# Patient Record
Sex: MALE | Race: WHITE | NOT HISPANIC OR LATINO | Employment: OTHER | URBAN - METROPOLITAN AREA
[De-identification: names, ages, dates, MRNs, and addresses within clinical notes are randomized per-mention and may not be internally consistent; named-entity substitution may affect disease eponyms.]

---

## 2017-03-28 RX ORDER — TEMAZEPAM 15 MG/1
15 CAPSULE ORAL NIGHTLY PRN
COMMUNITY
End: 2022-01-05

## 2017-03-28 RX ORDER — ACETAMINOPHEN 325 MG/1
325 TABLET ORAL EVERY 6 HOURS PRN
COMMUNITY
End: 2022-03-09

## 2017-03-28 RX ORDER — CYCLOBENZAPRINE HCL 10 MG
10 TABLET ORAL 3 TIMES DAILY PRN
COMMUNITY
End: 2022-03-09

## 2017-03-28 RX ORDER — DOFETILIDE 0.25 MG/1
250 CAPSULE ORAL EVERY 12 HOURS
COMMUNITY
End: 2017-03-31

## 2017-03-28 RX ORDER — PRAVASTATIN SODIUM 40 MG/1
40 TABLET ORAL NIGHTLY
COMMUNITY
End: 2022-03-09

## 2017-03-28 RX ORDER — WARFARIN 2 MG/1
2 TABLET ORAL DAILY
COMMUNITY
End: 2022-01-05

## 2017-03-28 RX ORDER — AMOXICILLIN 500 MG
2 CAPSULE ORAL DAILY
COMMUNITY

## 2017-03-28 RX ORDER — CARVEDILOL 25 MG/1
25 TABLET ORAL 2 TIMES DAILY WITH MEALS
COMMUNITY

## 2017-03-28 RX ORDER — DOXAZOSIN 8 MG/1
4 TABLET ORAL NIGHTLY
COMMUNITY
End: 2022-03-09

## 2017-03-28 RX ORDER — PYRIDOXINE HCL (VITAMIN B6) 100 MG
50 TABLET ORAL DAILY
COMMUNITY

## 2017-03-28 RX ORDER — FOLIC ACID 1 MG/1
1 TABLET ORAL DAILY
COMMUNITY

## 2017-03-28 RX ORDER — TAMSULOSIN HYDROCHLORIDE 0.4 MG/1
0.4 CAPSULE ORAL NIGHTLY
COMMUNITY

## 2017-03-28 RX ORDER — OMEPRAZOLE 20 MG/1
20 CAPSULE, DELAYED RELEASE ORAL DAILY
Status: ON HOLD | COMMUNITY
End: 2017-05-17

## 2017-03-28 RX ORDER — LANOLIN ALCOHOL/MO/W.PET/CERES
100 CREAM (GRAM) TOPICAL DAILY
COMMUNITY

## 2017-03-31 ENCOUNTER — OFFICE VISIT (OUTPATIENT)
Dept: CARDIOLOGY | Facility: CLINIC | Age: 72
End: 2017-03-31
Payer: MEDICARE

## 2017-03-31 VITALS — DIASTOLIC BLOOD PRESSURE: 62 MMHG | SYSTOLIC BLOOD PRESSURE: 116 MMHG | WEIGHT: 205.31 LBS | HEART RATE: 88 BPM

## 2017-03-31 DIAGNOSIS — I10 ESSENTIAL HYPERTENSION: ICD-10-CM

## 2017-03-31 DIAGNOSIS — Z79.01 WARFARIN ANTICOAGULATION: ICD-10-CM

## 2017-03-31 DIAGNOSIS — I48.19 PERSISTENT ATRIAL FIBRILLATION: ICD-10-CM

## 2017-03-31 DIAGNOSIS — Z79.899 LONG TERM CURRENT USE OF ANTIARRHYTHMIC DRUG: ICD-10-CM

## 2017-03-31 PROCEDURE — 99205 OFFICE O/P NEW HI 60 MIN: CPT | Mod: S$GLB,,, | Performed by: INTERNAL MEDICINE

## 2017-03-31 RX ORDER — VERAPAMIL HYDROCHLORIDE 180 MG/1
180 CAPSULE, EXTENDED RELEASE ORAL DAILY
Qty: 30 CAPSULE | Refills: 11 | Status: ON HOLD | OUTPATIENT
Start: 2017-03-31 | End: 2017-05-16

## 2017-03-31 NOTE — PROGRESS NOTES
Subjective:     HPI    Cardiologist: Nathalie Lorenz MD    I had the pleasure of seeing Humble Narvaez in consultation at your request for the evaluation of atrial fibrillation. He is a 71-year old male with a history of HTN, gout, and GERD, whose history of AF dates back to 2004 when he began to note occasional fatigue and GUO. In 2009 he was diagnosed with AF. He underwent an electrical cardioversion, and was started on Warfarin and Tikosyn around that time. Over the years Mr. Narvaez has had episodes 1-2 times per month generally lasting 10-12 hours. However, recently episodes have been occurring more frequently and lasting longer. He thinks his AF has been persistent since 1/2017.  Carvedilol was recently doubled, which somewhat improved Mr. Narvaez's AF symptoms, which include dizziness, fatigue, SOB and GUO.    I reviewed all cardiac records in the EMR. ECGs dated 3/2017 and 12/2016 show AF with rates in the  bpm range. Other ECGs show sinus rhythm in the 80-90s bpm range. A cardiac catheterization done in 11/2016 showed a 50% DX1 lesion, and otherwise no CAD. Finally, an echo done in 10/2016 revealed a normal EF and LA size, and no significant valvular disease.     I reviewed today's ECG tracing, which shows atrial fibrillation with intermittent aberrant conduction, at 120 bpm.    Review of Systems   Constitution: Positive for malaise/fatigue. Negative for decreased appetite, weight gain and weight loss.   HENT: Negative for sore throat.    Eyes: Negative for blurred vision.   Cardiovascular: Positive for dyspnea on exertion. Negative for chest pain, irregular heartbeat, leg swelling, near-syncope, orthopnea, palpitations, paroxysmal nocturnal dyspnea and syncope.   Respiratory: Positive for shortness of breath.    Skin: Negative for rash.   Musculoskeletal: Negative for arthritis.   Gastrointestinal: Negative for abdominal pain.   Neurological: Negative for focal weakness.   Psychiatric/Behavioral: Negative for  altered mental status.        Objective:    Physical Exam   Constitutional: He is oriented to person, place, and time. He appears well-developed and well-nourished. No distress.   HENT:   Head: Normocephalic and atraumatic.   Mouth/Throat: Oropharynx is clear and moist.   Eyes: Pupils are equal, round, and reactive to light. No scleral icterus.   Neck: Neck supple. No thyromegaly present.   Cardiovascular: Normal heart sounds and normal pulses.  An irregularly irregular rhythm present. Tachycardia present.  Exam reveals no gallop and no friction rub.    No murmur heard.  Pulmonary/Chest: Effort normal and breath sounds normal.   Abdominal: Soft. Bowel sounds are normal. He exhibits no distension. There is no tenderness.   Musculoskeletal: He exhibits no edema.   Neurological: He is alert and oriented to person, place, and time.   Skin: Skin is warm and dry. No rash noted.   Psychiatric: He has a normal mood and affect. His behavior is normal.   Vitals reviewed.        Assessment:       1. Persistent atrial fibrillation    2. Long term current use of antiarrhythmic drug    3. Warfarin anticoagulation    4. Essential hypertension         Plan:   In summary, Humble Narvaez is a 71-year old male with a history of HTN and persistent symptomatic AF refractory to Tikosyn. We discussed the pathophysiology of Tikosyn as well as the myriad of treatment options available to manage it including medications and ablation. We specifically discussed the risks, benefits, indications, and alternatives to PVI. Risks discussed include hematoma and vascular injury, cardiac tamponade, stroke, PV stenosis, AE fistula, phrenic nerve damage, and death. After considering his options, he has decided to proceed with ablation.    For now, the plan is to stop tikosyn. In 5-days, he will start Verapamil  mg daily. A pre-procedure CT scan will be done in [place]. Warfarin will not be held for the procedure. Post-procedure, Amiodarone will be  started.    Thank you for allowing me to participate in the care of this patient. Please do not hesitate to call me with any questions or concerns.

## 2017-04-24 ENCOUNTER — TELEPHONE (OUTPATIENT)
Dept: ELECTROPHYSIOLOGY | Facility: CLINIC | Age: 72
End: 2017-04-24

## 2017-04-24 NOTE — TELEPHONE ENCOUNTER
Pt was calling about dates for testing needed prior to procedure. Advised Pt that we would be in touch with him as soon as it was finalized. Pt would like for us to call and coordinate dates with him because he has a friend that's dying and doesn't want dates to conflict.

## 2017-04-24 NOTE — TELEPHONE ENCOUNTER
----- Message from Lillian Lundy RN sent at 4/24/2017  3:14 PM CDT -----  Contact: self   Please let him know he is scheduled for 5/16/17 and we will call him soon to review instructions.   ----- Message -----     From: Debi He MA     Sent: 4/24/2017  10:42 AM       To: Lillian Lundy RN    Pt.is calling  to find out when procedure is going to be. Please call either phone.  pt.

## 2017-05-05 ENCOUNTER — TELEPHONE (OUTPATIENT)
Dept: ELECTROPHYSIOLOGY | Facility: CLINIC | Age: 72
End: 2017-05-05

## 2017-05-05 NOTE — TELEPHONE ENCOUNTER
----- Message from Summer Bolanos sent at 5/5/2017  1:51 PM CDT -----  Contact: pt   Pt returned your call.  He can be reached @ [phone].  Thanks!!

## 2017-05-05 NOTE — PROGRESS NOTES
ABLATION EDUCATION CHECKLIST    5/15/17 - RENÉE  This test will be performed by Dr. Lorenz  Someone from Dr. Lorenz's staff will contact you prior to your test with instructions, including test time and location.     5/16/17 @ 6 AM  Report to Cardiology Waiting Room on 3rd floor of the Hospital    (Do not report to clinic)  Directions for Reporting to Cardiology Waiting Area in the Hospital  If you park in the Parking Garage:  Take elevators to the 2nd floor  Walk up ramp and turn right by Gold Elevators  Take elevator to the 3rd floor  Upon exiting the elevator, turn away from the clinic areas  Walk long hayward around to front of hospital to area with windows overlooking [facility]  Check in at Reception Desk  OR  If family is dropping you off:  Have them drop you off at the front of the Hospital  (Near the ER, where all the flags are hung).  Take the E elevators to the 3rd floor.  Check in at the Reception Desk in the waiting room.    Do not eat or drink anything after: 12 mn on the night before your procedure    Medications:   You may take your usual morning medications with a sip of water (including warfarin/coumadin)    You will be spending the night after your procedure  You will need someone to drive you home the day after your procedure.    Your pain during your procedure will be managed by the anesthesia team.     THE ABOVE INSTRUCTIONS WERE GIVEN TO THE PATIENT VERBALLY AND THEY VERBALIZED UNDERSTANDING.  THEY DO NOT REQUIRE ANY SPECIAL NEEDS AND DO NOT HAVE ANY LEARNING BARRIERS.    Any need to reschedule or cancel procedures, or any questions regarding your procedures should be addressed directly with the Arrhythmia Department Nurses at the following phone number: [phone]

## 2017-05-05 NOTE — TELEPHONE ENCOUNTER
Called pt back, reviewed pre op instructions including RENÉE with Dr Lorenz on 5/15. Pt verbalized understanding.

## 2017-05-11 ENCOUNTER — TELEPHONE (OUTPATIENT)
Dept: ELECTROPHYSIOLOGY | Facility: CLINIC | Age: 72
End: 2017-05-11

## 2017-05-11 NOTE — TELEPHONE ENCOUNTER
----- Message from Braulio Li sent at 5/11/2017  9:01 AM CDT -----  Contact: patient  Please call pt at [phone]. Patient is having an ablation by Dr LUCI Elizondo on next week but needs medication instructions that was not included in mail packet    Thank you

## 2017-05-15 ENCOUNTER — TELEPHONE (OUTPATIENT)
Dept: ELECTROPHYSIOLOGY | Facility: CLINIC | Age: 72
End: 2017-05-15

## 2017-05-15 ENCOUNTER — ANESTHESIA EVENT (OUTPATIENT)
Dept: MEDSURG UNIT | Facility: HOSPITAL | Age: 72
End: 2017-05-15
Payer: MEDICARE

## 2017-05-16 ENCOUNTER — SURGERY (OUTPATIENT)
Age: 72
End: 2017-05-16

## 2017-05-16 ENCOUNTER — HOSPITAL ENCOUNTER (OUTPATIENT)
Facility: HOSPITAL | Age: 72
Discharge: HOME | End: 2017-05-17
Attending: INTERNAL MEDICINE | Admitting: INTERNAL MEDICINE
Payer: MEDICARE

## 2017-05-16 ENCOUNTER — ANESTHESIA (OUTPATIENT)
Dept: MEDSURG UNIT | Facility: HOSPITAL | Age: 72
End: 2017-05-16
Payer: MEDICARE

## 2017-05-16 DIAGNOSIS — I48.19 PERSISTENT ATRIAL FIBRILLATION: Primary | ICD-10-CM

## 2017-05-16 DIAGNOSIS — I48.91 ATRIAL FIBRILLATION: ICD-10-CM

## 2017-05-16 LAB
ABO + RH BLD: NORMAL
BLD GP AB SCN CELLS X3 SERPL QL: NORMAL
INR PPP: 2.7
PROTHROMBIN TIME: 26.8 SEC

## 2017-05-16 PROCEDURE — 86900 BLOOD TYPING SEROLOGIC ABO: CPT

## 2017-05-16 PROCEDURE — 37000009 HC ANESTHESIA EA ADD 15 MINS: Performed by: INTERNAL MEDICINE

## 2017-05-16 PROCEDURE — 63600175 PHARM REV CODE 636 W HCPCS

## 2017-05-16 PROCEDURE — 93656 COMPRE EP EVAL ABLTJ ATR FIB: CPT | Mod: ,,, | Performed by: INTERNAL MEDICINE

## 2017-05-16 PROCEDURE — D9220A PRA ANESTHESIA: Mod: ANES,,, | Performed by: ANESTHESIOLOGY

## 2017-05-16 PROCEDURE — 93662 INTRACARDIAC ECG (ICE): CPT | Mod: 26,,, | Performed by: INTERNAL MEDICINE

## 2017-05-16 PROCEDURE — 37000008 HC ANESTHESIA 1ST 15 MINUTES: Performed by: INTERNAL MEDICINE

## 2017-05-16 PROCEDURE — 27201037 HC PRESSURE MONITORING SET UP

## 2017-05-16 PROCEDURE — 27100025 HC TUBING, SET FLUID WARMER: Performed by: NURSE ANESTHETIST, CERTIFIED REGISTERED

## 2017-05-16 PROCEDURE — 93010 ELECTROCARDIOGRAM REPORT: CPT | Mod: 76,,, | Performed by: INTERNAL MEDICINE

## 2017-05-16 PROCEDURE — 86850 RBC ANTIBODY SCREEN: CPT

## 2017-05-16 PROCEDURE — 25000003 PHARM REV CODE 250

## 2017-05-16 PROCEDURE — 63600175 PHARM REV CODE 636 W HCPCS: Performed by: ANESTHESIOLOGY

## 2017-05-16 PROCEDURE — 93613 INTRACARDIAC EPHYS 3D MAPG: CPT | Mod: ,,, | Performed by: INTERNAL MEDICINE

## 2017-05-16 PROCEDURE — 36620 INSERTION CATHETER ARTERY: CPT | Mod: 59,,, | Performed by: ANESTHESIOLOGY

## 2017-05-16 PROCEDURE — 25000003 PHARM REV CODE 250: Performed by: NURSE ANESTHETIST, CERTIFIED REGISTERED

## 2017-05-16 PROCEDURE — 93010 ELECTROCARDIOGRAM REPORT: CPT | Mod: ,,, | Performed by: INTERNAL MEDICINE

## 2017-05-16 PROCEDURE — D9220A PRA ANESTHESIA: Mod: CRNA,,, | Performed by: NURSE ANESTHETIST, CERTIFIED REGISTERED

## 2017-05-16 PROCEDURE — 63600175 PHARM REV CODE 636 W HCPCS: Performed by: NURSE ANESTHETIST, CERTIFIED REGISTERED

## 2017-05-16 PROCEDURE — 25000003 PHARM REV CODE 250: Performed by: INTERNAL MEDICINE

## 2017-05-16 PROCEDURE — 93662 INTRACARDIAC ECG (ICE): CPT

## 2017-05-16 PROCEDURE — 85610 PROTHROMBIN TIME: CPT

## 2017-05-16 PROCEDURE — 93005 ELECTROCARDIOGRAM TRACING: CPT | Mod: 59

## 2017-05-16 RX ORDER — PRAVASTATIN SODIUM 40 MG/1
40 TABLET ORAL NIGHTLY
Status: DISCONTINUED | OUTPATIENT
Start: 2017-05-16 | End: 2017-05-17 | Stop reason: HOSPADM

## 2017-05-16 RX ORDER — ROCURONIUM BROMIDE 10 MG/ML
INJECTION, SOLUTION INTRAVENOUS
Status: DISCONTINUED | OUTPATIENT
Start: 2017-05-16 | End: 2017-05-16

## 2017-05-16 RX ORDER — ONDANSETRON 2 MG/ML
4 INJECTION INTRAMUSCULAR; INTRAVENOUS ONCE
Status: COMPLETED | OUTPATIENT
Start: 2017-05-16 | End: 2017-05-16

## 2017-05-16 RX ORDER — SODIUM CHLORIDE 9 MG/ML
INJECTION, SOLUTION INTRAVENOUS CONTINUOUS PRN
Status: DISCONTINUED | OUTPATIENT
Start: 2017-05-16 | End: 2017-05-16

## 2017-05-16 RX ORDER — FUROSEMIDE 10 MG/ML
INJECTION INTRAMUSCULAR; INTRAVENOUS
Status: DISCONTINUED | OUTPATIENT
Start: 2017-05-16 | End: 2017-05-16

## 2017-05-16 RX ORDER — SUCCINYLCHOLINE CHLORIDE 20 MG/ML
INJECTION INTRAMUSCULAR; INTRAVENOUS
Status: DISCONTINUED | OUTPATIENT
Start: 2017-05-16 | End: 2017-05-16

## 2017-05-16 RX ORDER — MIDAZOLAM HYDROCHLORIDE 1 MG/ML
INJECTION, SOLUTION INTRAMUSCULAR; INTRAVENOUS
Status: DISCONTINUED | OUTPATIENT
Start: 2017-05-16 | End: 2017-05-16

## 2017-05-16 RX ORDER — IBUPROFEN 600 MG/1
600 TABLET ORAL EVERY 6 HOURS PRN
Status: DISCONTINUED | OUTPATIENT
Start: 2017-05-16 | End: 2017-05-17 | Stop reason: HOSPADM

## 2017-05-16 RX ORDER — DOXAZOSIN 4 MG/1
4 TABLET ORAL NIGHTLY
Status: DISCONTINUED | OUTPATIENT
Start: 2017-05-16 | End: 2017-05-17 | Stop reason: HOSPADM

## 2017-05-16 RX ORDER — HEPARIN SODIUM 1000 [USP'U]/ML
INJECTION, SOLUTION INTRAVENOUS; SUBCUTANEOUS
Status: DISCONTINUED | OUTPATIENT
Start: 2017-05-16 | End: 2017-05-16

## 2017-05-16 RX ORDER — PROTAMINE SULFATE 10 MG/ML
INJECTION, SOLUTION INTRAVENOUS
Status: DISCONTINUED | OUTPATIENT
Start: 2017-05-16 | End: 2017-05-16

## 2017-05-16 RX ORDER — PHENYLEPHRINE HYDROCHLORIDE 10 MG/ML
INJECTION INTRAVENOUS
Status: DISCONTINUED | OUTPATIENT
Start: 2017-05-16 | End: 2017-05-16

## 2017-05-16 RX ORDER — WARFARIN 2 MG/1
2 TABLET ORAL DAILY
Status: DISCONTINUED | OUTPATIENT
Start: 2017-05-16 | End: 2017-05-17 | Stop reason: HOSPADM

## 2017-05-16 RX ORDER — TAMSULOSIN HYDROCHLORIDE 0.4 MG/1
0.4 CAPSULE ORAL NIGHTLY
Status: DISCONTINUED | OUTPATIENT
Start: 2017-05-16 | End: 2017-05-17 | Stop reason: HOSPADM

## 2017-05-16 RX ORDER — PANTOPRAZOLE SODIUM 40 MG/1
40 TABLET, DELAYED RELEASE ORAL DAILY
Status: DISCONTINUED | OUTPATIENT
Start: 2017-05-16 | End: 2017-05-17 | Stop reason: HOSPADM

## 2017-05-16 RX ORDER — FENTANYL CITRATE 50 UG/ML
50 INJECTION, SOLUTION INTRAMUSCULAR; INTRAVENOUS EVERY 10 MIN PRN
Status: COMPLETED | OUTPATIENT
Start: 2017-05-16 | End: 2017-05-16

## 2017-05-16 RX ORDER — AMIODARONE HYDROCHLORIDE 200 MG/1
400 TABLET ORAL 2 TIMES DAILY
Status: DISCONTINUED | OUTPATIENT
Start: 2017-05-16 | End: 2017-05-17 | Stop reason: HOSPADM

## 2017-05-16 RX ORDER — PROPOFOL 10 MG/ML
INJECTION, EMULSION INTRAVENOUS
Status: DISCONTINUED | OUTPATIENT
Start: 2017-05-16 | End: 2017-05-16

## 2017-05-16 RX ORDER — ONDANSETRON 4 MG/1
4 TABLET, FILM COATED ORAL ONCE
Status: DISCONTINUED | OUTPATIENT
Start: 2017-05-16 | End: 2017-05-17 | Stop reason: HOSPADM

## 2017-05-16 RX ORDER — HEPARIN SODIUM 10000 [USP'U]/100ML
INJECTION, SOLUTION INTRAVENOUS CONTINUOUS PRN
Status: DISCONTINUED | OUTPATIENT
Start: 2017-05-16 | End: 2017-05-16

## 2017-05-16 RX ORDER — VASOPRESSIN 20 [USP'U]/ML
INJECTION, SOLUTION INTRAMUSCULAR; SUBCUTANEOUS
Status: DISCONTINUED | OUTPATIENT
Start: 2017-05-16 | End: 2017-05-16

## 2017-05-16 RX ORDER — LIDOCAINE HCL/PF 100 MG/5ML
SYRINGE (ML) INTRAVENOUS
Status: DISCONTINUED | OUTPATIENT
Start: 2017-05-16 | End: 2017-05-16

## 2017-05-16 RX ORDER — CYCLOBENZAPRINE HCL 5 MG
10 TABLET ORAL 3 TIMES DAILY PRN
Status: DISCONTINUED | OUTPATIENT
Start: 2017-05-16 | End: 2017-05-17 | Stop reason: HOSPADM

## 2017-05-16 RX ORDER — FENTANYL CITRATE 50 UG/ML
INJECTION, SOLUTION INTRAMUSCULAR; INTRAVENOUS
Status: DISCONTINUED | OUTPATIENT
Start: 2017-05-16 | End: 2017-05-16

## 2017-05-16 RX ORDER — CARVEDILOL 25 MG/1
25 TABLET ORAL 2 TIMES DAILY WITH MEALS
Status: DISCONTINUED | OUTPATIENT
Start: 2017-05-16 | End: 2017-05-17 | Stop reason: HOSPADM

## 2017-05-16 RX ADMIN — PHENYLEPHRINE HYDROCHLORIDE 0.25 MCG/KG/MIN: 10 INJECTION INTRAVENOUS at 08:05

## 2017-05-16 RX ADMIN — PRAVASTATIN SODIUM 40 MG: 40 TABLET ORAL at 09:05

## 2017-05-16 RX ADMIN — FENTANYL CITRATE 100 MCG: 50 INJECTION, SOLUTION INTRAMUSCULAR; INTRAVENOUS at 07:05

## 2017-05-16 RX ADMIN — AMIODARONE HYDROCHLORIDE 400 MG: 200 TABLET ORAL at 02:05

## 2017-05-16 RX ADMIN — SODIUM CHLORIDE, SODIUM GLUCONATE, SODIUM ACETATE, POTASSIUM CHLORIDE, MAGNESIUM CHLORIDE, SODIUM PHOSPHATE, DIBASIC, AND POTASSIUM PHOSPHATE: .53; .5; .37; .037; .03; .012; .00082 INJECTION, SOLUTION INTRAVENOUS at 07:05

## 2017-05-16 RX ADMIN — FENTANYL CITRATE 50 MCG: 50 INJECTION INTRAMUSCULAR; INTRAVENOUS at 02:05

## 2017-05-16 RX ADMIN — PHENYLEPHRINE HYDROCHLORIDE 200 MCG: 10 INJECTION INTRAVENOUS at 07:05

## 2017-05-16 RX ADMIN — HEPARIN SODIUM 9000 UNITS: 1000 INJECTION INTRAVENOUS; SUBCUTANEOUS at 08:05

## 2017-05-16 RX ADMIN — ONDANSETRON 4 MG: 2 INJECTION INTRAMUSCULAR; INTRAVENOUS at 12:05

## 2017-05-16 RX ADMIN — HEPARIN SODIUM 2000 UNITS: 1000 INJECTION INTRAVENOUS; SUBCUTANEOUS at 09:05

## 2017-05-16 RX ADMIN — HEPARIN SODIUM 4000 UNITS: 1000 INJECTION INTRAVENOUS; SUBCUTANEOUS at 09:05

## 2017-05-16 RX ADMIN — PHENYLEPHRINE HYDROCHLORIDE 100 MCG: 10 INJECTION INTRAVENOUS at 08:05

## 2017-05-16 RX ADMIN — VASOPRESSIN 1 UNITS: 20 INJECTION INTRAVENOUS at 08:05

## 2017-05-16 RX ADMIN — DOXAZOSIN 4 MG: 4 TABLET ORAL at 09:05

## 2017-05-16 RX ADMIN — PHENYLEPHRINE HYDROCHLORIDE 100 MCG: 10 INJECTION INTRAVENOUS at 07:05

## 2017-05-16 RX ADMIN — ROCURONIUM BROMIDE 5 MG: 10 INJECTION, SOLUTION INTRAVENOUS at 07:05

## 2017-05-16 RX ADMIN — HEPARIN SODIUM AND DEXTROSE 3000 UNITS/HR: 10000; 5 INJECTION INTRAVENOUS at 08:05

## 2017-05-16 RX ADMIN — SUCCINYLCHOLINE CHLORIDE 140 MG: 20 INJECTION, SOLUTION INTRAMUSCULAR; INTRAVENOUS at 07:05

## 2017-05-16 RX ADMIN — WARFARIN SODIUM 2 MG: 2 TABLET ORAL at 06:05

## 2017-05-16 RX ADMIN — VASOPRESSIN 1 UNITS: 20 INJECTION INTRAVENOUS at 09:05

## 2017-05-16 RX ADMIN — FENTANYL CITRATE 25 MCG: 50 INJECTION, SOLUTION INTRAMUSCULAR; INTRAVENOUS at 11:05

## 2017-05-16 RX ADMIN — PANTOPRAZOLE SODIUM 40 MG: 40 TABLET, DELAYED RELEASE ORAL at 02:05

## 2017-05-16 RX ADMIN — EPHEDRINE SULFATE 10 MG: 50 INJECTION, SOLUTION INTRAMUSCULAR; INTRAVENOUS; SUBCUTANEOUS at 08:05

## 2017-05-16 RX ADMIN — TAMSULOSIN HYDROCHLORIDE 0.4 MG: 0.4 CAPSULE ORAL at 09:05

## 2017-05-16 RX ADMIN — FUROSEMIDE 40 MG: 10 INJECTION, SOLUTION INTRAMUSCULAR; INTRAVENOUS at 11:05

## 2017-05-16 RX ADMIN — CARVEDILOL 25 MG: 25 TABLET, FILM COATED ORAL at 06:05

## 2017-05-16 RX ADMIN — IBUPROFEN 600 MG: 400 TABLET, FILM COATED ORAL at 02:05

## 2017-05-16 RX ADMIN — LIDOCAINE HYDROCHLORIDE 100 MG: 20 INJECTION, SOLUTION INTRAVENOUS at 07:05

## 2017-05-16 RX ADMIN — PROPOFOL 150 MG: 10 INJECTION, EMULSION INTRAVENOUS at 07:05

## 2017-05-16 RX ADMIN — SODIUM CHLORIDE: 0.9 INJECTION, SOLUTION INTRAVENOUS at 07:05

## 2017-05-16 RX ADMIN — PROTAMINE SULFATE 60 MG: 10 INJECTION, SOLUTION INTRAVENOUS at 11:05

## 2017-05-16 RX ADMIN — MIDAZOLAM HYDROCHLORIDE 1 MG: 1 INJECTION INTRAMUSCULAR; INTRAVENOUS at 07:05

## 2017-05-16 NOTE — ANESTHESIA RELEASE NOTE
"Anesthesia Release from PACU Note    Patient: Humble Narvaez    Procedure(s) Performed: Procedure(s) (LRB):  ABLATION (N/A)    Anesthesia type: GEN    Post pain: Adequate analgesia reported    Post assessment: no apparent anesthetic complications, tolerated procedure well and no evidence of recall    Post vital signs: /74  Pulse 65  Temp 36.5 °C (97.7 °F) (Oral)   Resp 14  Ht 5' 10" (1.778 m)  Wt 93 kg (205 lb)  SpO2 98%  BMI 29.41 kg/m2    Level of consciousness: awake, alert and oriented    Nausea/Vomiting: no nausea/no vomiting    Complications: none    Airway Patency: patent    Respiratory: unassisted, spontaneous ventilation, room air    Cardiovascular: stable and blood pressure at baseline    Hydration: euvolemic    "

## 2017-05-16 NOTE — H&P
[facility]  Cardiology  History and Physical     Subjective:     Chief Complaint:  Persistent AF    HPI:   71-year old male with a history of HTN, gout, and GERD, whose history of AF dates back to 2004 when he began to note occasional fatigue and GUO. In 2009 he was diagnosed with AF. He underwent an electrical cardioversion, and was started on Warfarin and Tikosyn around that time. Over the years Mr. Narvaez has had episodes 1-2 times per month generally lasting 10-12 hours. However, recently episodes have been occurring more frequently and lasting longer. He thinks his AF has been persistent since 1/2017. Carvedilol was recently doubled, which somewhat improved Mr. Narvaez's AF symptoms, which include dizziness, fatigue, SOB and GUO.     I reviewed all cardiac records in the EMR. ECGs dated 3/2017 and 12/2016 show AF with rates in the  bpm range. Other ECGs show sinus rhythm in the 80-90s bpm range. A cardiac catheterization done in 11/2016 showed a 50% DX1 lesion, and otherwise no CAD. Finally, an echo done in 10/2016 revealed a normal EF and LA size, and no significant valvular disease.     RENÉE yesterday with no evidence of WARREN or LA thrombus read by Dr. Nathalie Lorenz.  Remains on coumadin, pending INR.    Past Medical History:   Diagnosis Date    Anticoagulant long-term use     Arthritis        Past Surgical History:   Procedure Laterality Date    BACK SURGERY      EYE SURGERY      TONSILLECTOMY         Review of patient's allergies indicates:   Allergen Reactions    Wasp sting [allergen ext-venom-honey bee] Anaphylaxis    Marie bean Swelling    Pcn [penicillins]        No current facility-administered medications on file prior to encounter.      Current Outpatient Prescriptions on File Prior to Encounter   Medication Sig    carvedilol (COREG) 25 MG tablet Take 25 mg by mouth 2 (two) times daily with meals.    cyanocobalamin (VITAMIN B-12) 1000 MCG tablet Take 100 mcg by mouth once  daily.    cyclobenzaprine (FLEXERIL) 10 MG tablet Take 10 mg by mouth 3 (three) times daily as needed for Muscle spasms.    doxazosin (CARDURA) 8 MG Tab Take 4 mg by mouth every evening.     fish oil-omega-3 fatty acids 300-1,000 mg capsule Take 2 g by mouth once daily.    folic acid (FOLVITE) 1 MG tablet Take 1 mg by mouth once daily.    LYCOPENE ORAL Take by mouth.    multivitamin capsule Take 1 capsule by mouth once daily.    omeprazole (PRILOSEC) 20 MG capsule Take 20 mg by mouth once daily.    pravastatin (PRAVACHOL) 40 MG tablet Take 40 mg by mouth every evening.     pyridoxine, vitamin B6, (VITAMIN B-6) 100 MG Tab Take 50 mg by mouth once daily.    saw palmetto 80 MG capsule Take 80 mg by mouth 2 (two) times daily.    tamsulosin (FLOMAX) 0.4 mg Cp24 Take 0.4 mg by mouth every evening.     temazepam (RESTORIL) 15 mg Cap Take 15 mg by mouth nightly as needed.    verapamil (VERELAN) 180 MG C24P Take 1 capsule (180 mg total) by mouth once daily.    warfarin (COUMADIN) 2 MG tablet Take 2 mg by mouth once daily.    acetaminophen (TYLENOL) 325 MG tablet Take 325 mg by mouth every 6 (six) hours as needed for Pain.     Family History     None        Social History Main Topics    Smoking status: Former Smoker     Quit date: 3/31/1977    Smokeless tobacco: Not on file    Alcohol use No    Drug use: Not on file    Sexual activity: Not on file     ROS   As per HPI, otherwise negative.    Objective:     Vital Signs (Most Recent):  Temp: 96.8 °F (36 °C) (05/16/17 0630)  Pulse: 62 (05/16/17 0630)  Resp: 18 (05/16/17 0630)  BP: 125/75 (05/16/17 0631)  SpO2: 98 % (05/16/17 0630) Vital Signs (24h Range):  Temp:  [96.8 °F (36 °C)] 96.8 °F (36 °C)  Pulse:  [62] 62  Resp:  [18] 18  SpO2:  [98 %] 98 %  BP: (125)/(74-75) 125/75     Weight: 93 kg (205 lb)  Body mass index is 29.41 kg/(m^2).    SpO2: 98 %  O2 Device (Oxygen Therapy): room air    No intake or output data in the 24 hours ending 05/16/17  0646    Lines/Drains/Airways          No matching active lines, drains, or airways          Physical Exam   Gen: alert, no distress  HEENT: no JVD, eomi  Cardiac: irregular, S1 and S2; no murmur  Pulm: non-labored, CTA  Abd: soft, non-tender  Ext: no edema or cyanosis  Neuro: no focal deficit  Skin: warm and dry    Significant Labs: Outside labs reviewed    Significant Imaging: RENÉE report reviewed    Assessment and Plan:     71 year old male with history of persistent AF despite AAD therapy presenting for PVI.    The risks (including but not limited to vascular injury, bleeding, cardiac perforation/tamponade, pulmonary vein stenosis, phrenic nerve injury, atrioesophageal fistula, stroke, heart attack, and death) and benefits of the procedure were explained to the patient and son in detail, questions answered, consent obtained.    Sedation per anesthesia.  Pending INR.    Jamie Pandya MD  Cardiology   [facility]

## 2017-05-16 NOTE — PROGRESS NOTES
Pt returned to room AAOx4 and denies pain.  VSS.  Bilateral groin sites c/d/i and soft.  Son called to bedside.  Post procedure protocol reviewed.  Will continue to monitor.

## 2017-05-16 NOTE — ANESTHESIA PROCEDURE NOTES
Arterial    Diagnosis: atrial fibrillation    Patient location during procedure: done in OR  Procedure start time: 5/16/2017 7:45 AM  Staffing  Anesthesiologist: DARLYN COLON  Performed by: anesthesiologist   Anesthesiologist was present at the time of the procedure.  Preanesthetic Checklist  Completed: patient identified, site marked, surgical consent, pre-op evaluation, timeout performed, IV checked, risks and benefits discussed, monitors and equipment checked and anesthesia consent given  Arterial Line  Skin Prep: chlorhexidine gluconate  Local Infiltration: none  Orientation: right  Location: radial  Catheter Size: 20 G{[facility] ANESTHESIA BLOCK ART PLACEMENTInsertion Attempts: 1  Assessment  Dressing: secured with tape and tegaderm

## 2017-05-16 NOTE — ANESTHESIA POSTPROCEDURE EVALUATION
"Anesthesia Post Evaluation    Patient: Humble Narvaez    Procedure(s) Performed: Procedure(s) (LRB):  ABLATION (N/A)    Final Anesthesia Type: general  Patient location during evaluation: PACU  Patient participation: Yes- Able to Participate  Level of consciousness: awake and alert  Post-procedure vital signs: reviewed and stable  Pain management: adequate  Airway patency: patent  PONV status at discharge: No PONV  Anesthetic complications: no      Cardiovascular status: blood pressure returned to baseline and stable  Respiratory status: unassisted  Hydration status: euvolemic  Follow-up not needed.        Visit Vitals    /74    Pulse 65    Temp 36.5 °C (97.7 °F) (Oral)    Resp 14    Ht 5' 10" (1.778 m)    Wt 93 kg (205 lb)    SpO2 98%    BMI 29.41 kg/m2       Pain/Maddie Score: Pain Assessment Performed: Yes (5/16/2017  2:00 PM)  Presence of Pain: denies (5/16/2017  2:00 PM)  Maddie Score: 10 (5/16/2017  2:00 PM)      "

## 2017-05-16 NOTE — NURSING TRANSFER
Nursing Transfer Note      5/16/2017     Transfer To: 317    Transfer via stretcher    Transfer with cardiac monitoring    Transported by RN    Medicines sent: no    Chart send with patient: Yes    Notified: son    Patient reassessed at: 5/16/17@1645hrs

## 2017-05-16 NOTE — ANESTHESIA PREPROCEDURE EVALUATION
05/16/2017  Humble Narvaez is a 71 y.o., male.  Patient Active Problem List   Diagnosis    Persistent atrial fibrillation    Long term current use of antiarrhythmic drug    Warfarin anticoagulation    Essential hypertension         Anesthesia Evaluation         Review of Systems      Physical Exam  General:  Well nourished    Airway/Jaw/Neck:  Airway Findings: Mouth Opening: Normal Tongue: Normal  General Airway Assessment: Adult  Mallampati: II  Improves to II with phonation.  TM Distance: Normal, at least 6 cm      Dental:  Dental Findings: In tact   Chest/Lungs:  Chest/Lungs Findings: Clear to auscultation     Heart/Vascular:  Heart Findings: Rate: Normal  Rhythm: Irregularly Irregular  Sounds: Normal        Mental Status:  Mental Status Findings:  Cooperative, Alert and Oriented         Anesthesia Plan  Type of Anesthesia, risks & benefits discussed:  Anesthesia Type:  general  Patient's Preference: General  Intra-op Monitoring Plan: standard ASA monitors and arterial line  Intra-op Monitoring Plan Comments: Standard ASA monitors.   Post Op Pain Control Plan:   Post Op Pain Control Plan Comments: Per primary service.     Induction:   IV  Beta Blocker:  Patient is not currently on a Beta-Blocker (No further documentation required).       Informed Consent: Patient understands risks and agrees with Anesthesia plan.  Questions answered. Anesthesia consent signed with patient.  ASA Score: 3     Day of Surgery Review of History & Physical:    H&P update referred to the surgeon.     Anesthesia Plan Notes: Chart reviewed, patient interviewed and examined.  The plan for general anesthesia was explained.  Questions were answered and the consent was signed.  Shanna PEOPLES         Ready For Surgery From Anesthesia Perspective.

## 2017-05-16 NOTE — IP AVS SNAPSHOT
[facility]  [address]  [facility] [address]  Phone: [phone]           Patient Discharge Instructions   Our goal is to set you up for success. This packet includes information on your condition, medications, and your home care.  It will help you care for yourself to prevent having to return to the hospital.     Please ask your nurse if you have any questions.      There are many details to remember when preparing to leave the hospital. Here is what you will need to do:    1. Take your medicine. If you are prescribed medications, review your Medication List on the following pages. You may have new medications to  at the pharmacy and others that you'll need to stop taking. Review the instructions for how and when to take your medications. Talk with your doctor or nurses if you are unsure of what to do.     2. Go to your follow-up appointments. Specific follow-up information is listed in the following pages. Your may be contacted by a nurse or clinical provider about future appointments. Be sure we have all of the phone numbers to reach you. Please contact your provider's office if you are unable to make an appointment.     3. Watch for warning signs. Your doctor or nurse will give you detailed warning signs to watch for and when to call for assistance. These instructions may also include educational information about your condition. If you experience any of warning signs to your health, call your doctor.           [facility]  Unless otherwise directed by your provider, please   contact [facility], our nurse care line   that is available for 24/7 assistance.     [phone] (toll-free)     Registered nurses in the [facility]   provide: appointment scheduling, clinical advisement, health education, and other advisory services.                  ** Verify the list of medication(s) below is accurate and up to date. Carry this with you in case of  emergency. If your medications have changed, please notify your healthcare provider.             Medication List      START taking these medications        Additional Info                      amiodarone 200 MG Tab   Commonly known as:  PACERONE   Quantity:  90 tablet   Refills:  3    Last time this was given:  400 mg on 5/16/2017  2:27 PM   Instructions:  Take 2 tablets twice a day for 2 weeks, then take 1 tablet daily     Begin Date    AM    Noon    PM    Bedtime         CHANGE how you take these medications        Additional Info                      omeprazole 20 MG capsule   Commonly known as:  PRILOSEC   Quantity:  30 capsule   Refills:  3   Dose:  40 mg   What changed:  how much to take    Instructions:  Take 2 capsules (40 mg total) by mouth once daily.     Begin Date    AM    Noon    PM    Bedtime         CONTINUE taking these medications        Additional Info                      carvedilol 25 MG tablet   Commonly known as:  COREG   Refills:  0   Dose:  25 mg    Last time this was given:  25 mg on 5/16/2017  6:24 PM   Instructions:  Take 25 mg by mouth 2 (two) times daily with meals.     Begin Date    AM    Noon    PM    Bedtime       cyclobenzaprine 10 MG tablet   Commonly known as:  FLEXERIL   Refills:  0   Dose:  10 mg    Instructions:  Take 10 mg by mouth 3 (three) times daily as needed for Muscle spasms.     Begin Date    AM    Noon    PM    Bedtime       doxazosin 8 MG Tab   Commonly known as:  CARDURA   Refills:  0   Dose:  4 mg    Last time this was given:  4 mg on 5/16/2017  9:53 PM   Instructions:  Take 4 mg by mouth every evening.     Begin Date    AM    Noon    PM    Bedtime       fish oil-omega-3 fatty acids 300-1,000 mg capsule   Refills:  0   Dose:  2 g    Instructions:  Take 2 g by mouth once daily.     Begin Date    AM    Noon    PM    Bedtime       FLOMAX 0.4 mg Cp24   Refills:  0   Dose:  0.4 mg   Generic drug:  tamsulosin    Last time this was given:  0.4 mg on 5/16/2017  9:53 PM    Instructions:  Take 0.4 mg by mouth every evening.     Begin Date    AM    Noon    PM    Bedtime       folic acid 1 MG tablet   Commonly known as:  FOLVITE   Refills:  0   Dose:  1 mg    Instructions:  Take 1 mg by mouth once daily.     Begin Date    AM    Noon    PM    Bedtime       LYCOPENE ORAL   Refills:  0    Instructions:  Take by mouth.     Begin Date    AM    Noon    PM    Bedtime       multivitamin capsule   Refills:  0   Dose:  1 capsule    Instructions:  Take 1 capsule by mouth once daily.     Begin Date    AM    Noon    PM    Bedtime       pravastatin 40 MG tablet   Commonly known as:  PRAVACHOL   Refills:  0   Dose:  40 mg    Last time this was given:  40 mg on 5/16/2017  9:53 PM   Instructions:  Take 40 mg by mouth every evening.     Begin Date    AM    Noon    PM    Bedtime       saw palmetto 80 MG capsule   Refills:  0   Dose:  80 mg    Instructions:  Take 80 mg by mouth 2 (two) times daily.     Begin Date    AM    Noon    PM    Bedtime       temazepam 15 mg Cap   Commonly known as:  RESTORIL   Refills:  0   Dose:  15 mg    Instructions:  Take 15 mg by mouth nightly as needed.     Begin Date    AM    Noon    PM    Bedtime       TYLENOL 325 MG tablet   Refills:  0   Dose:  325 mg   Generic drug:  acetaminophen    Instructions:  Take 325 mg by mouth every 6 (six) hours as needed for Pain.     Begin Date    AM    Noon    PM    Bedtime       VITAMIN B-12 1000 MCG tablet   Refills:  0   Dose:  100 mcg   Generic drug:  cyanocobalamin    Instructions:  Take 100 mcg by mouth once daily.     Begin Date    AM    Noon    PM    Bedtime       VITAMIN B-6 100 MG Tab   Refills:  0   Dose:  50 mg   Generic drug:  pyridoxine (vitamin B6)    Instructions:  Take 50 mg by mouth once daily.     Begin Date    AM    Noon    PM    Bedtime       warfarin 2 MG tablet   Commonly known as:  COUMADIN   Refills:  0   Dose:  2 mg    Last time this was given:  2 mg on 5/16/2017  6:24 PM   Instructions:  Take 2 mg by mouth once  "daily.     Begin Date    AM    Noon    PM    Bedtime         ASK your doctor about these medications        Additional Info                      verapamil 180 MG C24p   Commonly known as:  VERELAN   Quantity:  30 capsule   Refills:  11   Dose:  180 mg    Instructions:  Take 1 capsule (180 mg total) by mouth once daily.     Begin Date    AM    Noon    PM    Bedtime            Where to Get Your Medications      These medications were sent to [facility] - [place], [place] - [address]  [address], [place] [address]     Phone:  [phone]     amiodarone 200 MG Tab    omeprazole 20 MG capsule                  Please bring to all follow up appointments:    1. A copy of your discharge instructions.  2. All medicines you are currently taking in their original bottles.  3. Identification and insurance card.    Please arrive 15 minutes ahead of scheduled appointment time.    Please call 24 hours in advance if you must reschedule your appointment and/or time.        Follow-up Information     Follow up with Alan Elizondo MD In 6 weeks.    Specialties:  Electrophysiology, Cardiovascular Disease    Contact information:    [address] [place] [address]  [phone]            Primary Diagnosis     Your primary diagnosis was:  Atrial Fibrillation (Irregular Heartbeat)      Admission Information     Date & Time Provider Department CSN    5/16/2017  5:49 AM Alan Elizondo MD [facility] [id number]      Care Providers     Provider Role Specialty Primary office phone    Alan Elizondo MD Attending Provider Electrophysiology [phone]    Alan Elizondo MD Surgeon  Electrophysiology [phone]      Your Vitals Were     BP Pulse Temp Resp Height Weight    140/80 (BP Location: Left arm, Patient Position: Sitting, BP Method: Automatic) 65 97.6 °F (36.4 °C) (Oral) 18 5' 10" (1.778 m) 93 kg (205 lb)    SpO2 BMI             97% 29.41 kg/m2         Recent Lab Values     No lab values " to display.      Allergies as of 5/17/2017        Reactions    Wasp Sting [Allergen Ext-venom-honey Bee] Anaphylaxis    Marie Cohen Swelling    Pcn [Penicillins]       Advance Directives     An advance directive is a document which, in the event you are no longer able to make decisions for yourself, tells your healthcare team what kind of treatment you do or do not want to receive, or who you would like to make those decisions for you.  If you do not currently have an advance directive, [facility] encourages you to create one.  For more information call:  [phone] ([phone]), [phone] ([phone]),  or log on to [email or web address].        Smoking Cessation     If you would like to quit smoking:   You may be eligible for free services if you are a [place] resident and started smoking cigarettes before September 1, 1988.  Call the Smoking Cessation Trust ([facility]) toll free at (222) [phone] or ([phone]) 623-8813.   Call 3-563-QUIT-NOW if you do not meet the above criteria.   Contact us via email: [email or web address]   View our website for more information: [email or web address]        Language Assistance Services     ATTENTION: Language assistance services are available, free of charge. Please call [phone].      ATENCIÓN: Si habla español, tiene a chaney disposición servicios gratuitos de asistencia lingüística. Llame al [phone].     CHÚ Ý: N?u b?n nói Ti?ng Vi?t, có các d?ch v? h? tr? ngôn ng? mi?n phí dành cho b?n. G?i s? [phone].        Coumadin Discharge Instructions                         [facility] Sign-Up     Activating your [facility] account is as easy as 1-2-3!     1) Visit [email or web address], select Sign Up Now, enter this activation code and your date of birth, then select Next.  [email or web address]  Expires: 7/1/2017  7:48 AM      2) Create a username and password to use when you visit [facility] in the future and select a security question in case you lose your password  and select Next.    3) Enter your e-mail address and click Sign Up!    Additional Information  If you have questions, please e-mail [email or web address] or call [phone] to talk to our [facility] staff. Remember, [facility] is NOT to be used for urgent needs. For medical emergencies, dial 911.          [facility] complies with applicable Federal civil rights laws and does not discriminate on the basis of race, color, national origin, age, disability, or sex.

## 2017-05-17 VITALS
DIASTOLIC BLOOD PRESSURE: 80 MMHG | SYSTOLIC BLOOD PRESSURE: 140 MMHG | RESPIRATION RATE: 18 BRPM | HEIGHT: 70 IN | BODY MASS INDEX: 29.35 KG/M2 | WEIGHT: 205 LBS | HEART RATE: 65 BPM | TEMPERATURE: 98 F | OXYGEN SATURATION: 97 %

## 2017-05-17 LAB
POC ACTIVATED CLOTTING TIME K: 188 SEC (ref 74–137)
SAMPLE: ABNORMAL

## 2017-05-17 PROCEDURE — 25000003 PHARM REV CODE 250: Performed by: INTERNAL MEDICINE

## 2017-05-17 RX ORDER — OMEPRAZOLE 20 MG/1
40 CAPSULE, DELAYED RELEASE ORAL DAILY
Qty: 30 CAPSULE | Refills: 3 | Status: SHIPPED | OUTPATIENT
Start: 2017-05-17

## 2017-05-17 RX ORDER — AMIODARONE HYDROCHLORIDE 200 MG/1
TABLET ORAL
Qty: 90 TABLET | Refills: 3 | Status: SHIPPED | OUTPATIENT
Start: 2017-05-17 | End: 2022-03-09

## 2017-05-17 RX ADMIN — AMIODARONE HYDROCHLORIDE 400 MG: 200 TABLET ORAL at 08:05

## 2017-05-17 RX ADMIN — CARVEDILOL 25 MG: 25 TABLET, FILM COATED ORAL at 08:05

## 2017-05-17 RX ADMIN — PANTOPRAZOLE SODIUM 40 MG: 40 TABLET, DELAYED RELEASE ORAL at 08:05

## 2017-05-17 NOTE — DISCHARGE SUMMARY
[facility]  Discharge Note  Short Stay    Admit Date: 5/16/2017    Discharge Date and Time: 05/17/2017    Attending Physician: Alan Elizondo MD     Discharge Provider: Jamie Pandya    Diagnoses:  Active Hospital Problems    Diagnosis  POA    *Atrial fibrillation [I48.91]  Yes      Resolved Hospital Problems    Diagnosis Date Resolved POA   No resolved problems to display.       Discharged Condition: good    Hospital Course: Patient was admitted for an outpatient procedure and tolerated the procedure well with no complications.  Bilateral groins soft, non-tender, no hematoma.    Final Diagnoses: Same as principal problem.    Disposition: Home or Self Care    Follow up/Patient Instructions:    Medications:  Reconciled Home Medications:   Current Discharge Medication List      START taking these medications    Details   amiodarone (PACERONE) 200 MG Tab Take 2 tablets twice a day for 2 weeks, then take 1 tablet daily  Qty: 90 tablet, Refills: 3         CONTINUE these medications which have CHANGED    Details   omeprazole (PRILOSEC) 20 MG capsule Take 2 capsules (40 mg total) by mouth once daily.  Qty: 30 capsule, Refills: 3         CONTINUE these medications which have NOT CHANGED    Details   carvedilol (COREG) 25 MG tablet Take 25 mg by mouth 2 (two) times daily with meals.      cyanocobalamin (VITAMIN B-12) 1000 MCG tablet Take 100 mcg by mouth once daily.      cyclobenzaprine (FLEXERIL) 10 MG tablet Take 10 mg by mouth 3 (three) times daily as needed for Muscle spasms.      doxazosin (CARDURA) 8 MG Tab Take 4 mg by mouth every evening.       fish oil-omega-3 fatty acids 300-1,000 mg capsule Take 2 g by mouth once daily.      folic acid (FOLVITE) 1 MG tablet Take 1 mg by mouth once daily.      LYCOPENE ORAL Take by mouth.      multivitamin capsule Take 1 capsule by mouth once daily.      pravastatin (PRAVACHOL) 40 MG tablet Take 40 mg by mouth every evening.       pyridoxine, vitamin B6, (VITAMIN  B-6) 100 MG Tab Take 50 mg by mouth once daily.      saw palmetto 80 MG capsule Take 80 mg by mouth 2 (two) times daily.      tamsulosin (FLOMAX) 0.4 mg Cp24 Take 0.4 mg by mouth every evening.       temazepam (RESTORIL) 15 mg Cap Take 15 mg by mouth nightly as needed.      warfarin (COUMADIN) 2 MG tablet Take 2 mg by mouth once daily.      acetaminophen (TYLENOL) 325 MG tablet Take 325 mg by mouth every 6 (six) hours as needed for Pain.         STOP taking these medications       verapamil (VERELAN) 180 MG C24P Comments:   Reason for Stopping:             No discharge procedures on file.  Follow-up Information     Follow up with Alan Elizondo MD In 6 weeks.    Specialties:  Electrophysiology, Cardiovascular Disease    Contact information:    [facility]  [facility] [address] [phone]            Discharge Procedure Orders (must include Diet, Follow-up, Activity):  No discharge procedures on file.

## 2017-05-17 NOTE — PLAN OF CARE
Pt verbalized an understanding of discharge instructions including diet, s/s to notify md, post procedure care, and follow up.   No driving or operating any heavy machinery for 24 hours.  Bilateral groin clean, dry and intact. +pulse, no hematoma. Pt left unit via wheel chair with patient transport.

## 2017-05-18 LAB
POC ACTIVATED CLOTTING TIME K: 265 SEC (ref 74–137)
POC ACTIVATED CLOTTING TIME K: 317 SEC (ref 74–137)
POC ACTIVATED CLOTTING TIME K: 353 SEC (ref 74–137)
POC ACTIVATED CLOTTING TIME K: 363 SEC (ref 74–137)
POC ACTIVATED CLOTTING TIME K: 368 SEC (ref 74–137)
POC ACTIVATED CLOTTING TIME K: 389 SEC (ref 74–137)
POC ACTIVATED CLOTTING TIME K: 399 SEC (ref 74–137)
SAMPLE: ABNORMAL

## 2017-08-01 ENCOUNTER — TELEPHONE (OUTPATIENT)
Dept: ELECTROPHYSIOLOGY | Facility: CLINIC | Age: 72
End: 2017-08-01

## 2017-08-01 NOTE — TELEPHONE ENCOUNTER
Called pt at both numbers to confirm appt for this Friday at [facility]. Home number just rings with no VM. LM on mobile number

## 2017-08-02 ENCOUNTER — TELEPHONE (OUTPATIENT)
Dept: ELECTROPHYSIOLOGY | Facility: CLINIC | Age: 72
End: 2017-08-02

## 2017-08-02 NOTE — TELEPHONE ENCOUNTER
Attempted to call pt to confirm appt on 8/4 at [facility]. Lm on pt mobile, no vm on home number

## 2022-01-05 ENCOUNTER — LAB VISIT (OUTPATIENT)
Dept: LAB | Facility: HOSPITAL | Age: 77
End: 2022-01-05
Attending: OTOLARYNGOLOGY
Payer: MEDICARE

## 2022-01-05 ENCOUNTER — OFFICE VISIT (OUTPATIENT)
Dept: OTOLARYNGOLOGY | Facility: CLINIC | Age: 77
End: 2022-01-05
Payer: MEDICARE

## 2022-01-05 VITALS — HEIGHT: 70 IN | BODY MASS INDEX: 29.61 KG/M2 | WEIGHT: 206.81 LBS

## 2022-01-05 DIAGNOSIS — E07.9 THYROID MASS: ICD-10-CM

## 2022-01-05 DIAGNOSIS — E04.9 SUBSTERNAL THYROID GOITER: ICD-10-CM

## 2022-01-05 DIAGNOSIS — E07.9 THYROID MASS: Primary | ICD-10-CM

## 2022-01-05 PROBLEM — Z79.01 WARFARIN ANTICOAGULATION: Status: RESOLVED | Noted: 2017-03-31 | Resolved: 2022-01-05

## 2022-01-05 LAB
T4 FREE SERPL-MCNC: 1.1 NG/DL (ref 0.71–1.51)
TSH SERPL DL<=0.005 MIU/L-ACNC: 2.04 UIU/ML (ref 0.4–4)

## 2022-01-05 PROCEDURE — 99204 PR OFFICE/OUTPT VISIT, NEW, LEVL IV, 45-59 MIN: ICD-10-PCS | Mod: S$PBB,,, | Performed by: OTOLARYNGOLOGY

## 2022-01-05 PROCEDURE — 84439 ASSAY OF FREE THYROXINE: CPT | Performed by: OTOLARYNGOLOGY

## 2022-01-05 PROCEDURE — 99204 OFFICE O/P NEW MOD 45 MIN: CPT | Mod: S$PBB,,, | Performed by: OTOLARYNGOLOGY

## 2022-01-05 PROCEDURE — 99999 PR PBB SHADOW E&M-NEW PATIENT-LVL IV: ICD-10-PCS | Mod: PBBFAC,,, | Performed by: OTOLARYNGOLOGY

## 2022-01-05 PROCEDURE — 84443 ASSAY THYROID STIM HORMONE: CPT | Performed by: OTOLARYNGOLOGY

## 2022-01-05 PROCEDURE — 99999 PR PBB SHADOW E&M-NEW PATIENT-LVL IV: CPT | Mod: PBBFAC,,, | Performed by: OTOLARYNGOLOGY

## 2022-01-05 PROCEDURE — 99204 OFFICE O/P NEW MOD 45 MIN: CPT | Mod: PBBFAC | Performed by: OTOLARYNGOLOGY

## 2022-01-05 PROCEDURE — 36415 COLL VENOUS BLD VENIPUNCTURE: CPT | Performed by: OTOLARYNGOLOGY

## 2022-01-05 RX ORDER — NAPROXEN 500 MG/1
500 TABLET ORAL 2 TIMES DAILY WITH MEALS
COMMUNITY

## 2022-01-05 RX ORDER — AMITRIPTYLINE HYDROCHLORIDE 50 MG/1
50 TABLET, FILM COATED ORAL NIGHTLY
COMMUNITY
Start: 2021-12-06 | End: 2022-03-09

## 2022-01-05 RX ORDER — ASPIRIN 81 MG/1
81 TABLET ORAL
COMMUNITY
End: 2022-01-05

## 2022-01-05 RX ORDER — GLIPIZIDE 5 MG/1
5 TABLET, FILM COATED, EXTENDED RELEASE ORAL
COMMUNITY
End: 2022-03-09

## 2022-01-05 RX ORDER — AMITRIPTYLINE HYDROCHLORIDE 25 MG/1
25 TABLET, FILM COATED ORAL NIGHTLY PRN
COMMUNITY

## 2022-01-05 RX ORDER — TELMISARTAN 40 MG/1
40 TABLET ORAL
COMMUNITY
End: 2022-01-05

## 2022-01-05 RX ORDER — ASPIRIN 81 MG/1
81 TABLET ORAL DAILY
COMMUNITY

## 2022-01-05 RX ORDER — TELMISARTAN 40 MG/1
TABLET ORAL DAILY
COMMUNITY

## 2022-01-05 RX ORDER — TRAZODONE HYDROCHLORIDE 50 MG/1
75 TABLET ORAL
COMMUNITY
End: 2022-01-05

## 2022-01-05 RX ORDER — TRAZODONE HYDROCHLORIDE 50 MG/1
50 TABLET ORAL NIGHTLY
COMMUNITY

## 2022-01-05 NOTE — H&P (VIEW-ONLY)
"Referring Provider:    Aaareferral Self  No address on file  Subjective:   Patient: Humble Narvaez [id number], :1945   Visit date:2022 9:44 AM    Chief Complaint:  Thyroid Nodule (Review US and CT scan obtained externally in [place] )    HPI:  Humble is a 76 y.o. male who I was asked to see in consultation for evaluation of the following issue(s):    Incidentally found when having chest CT for concern of axillary adenopathy.  No adenopathy found.     COMPRESSIVE SYMPTOMS OR MINOR RISK FACTORS FOR THYROID CANCER (Negative unless checked):  []  Increasing in size over the past 6 months  []  Dysphagia   []  Dyspnea on exertion  []  Orthopnea  []  Hemoptysis  []  Voice changes  []  Pain  [x]  AGE >45    []  FEMALE     HIGH RISK HISTORY FOR THYROID CANCER:  []  Thyroid cancer in 1 or more first degree relatives  []  History of radiation exposure to the neck  []  Prior thyroidectomy with dx of thyroid cancer  []  PET positive nodule  []  Multiple Endocrine Neoplasia  []  Familial Medullary Thyroid Cancer    (2009 REVISED AMERICAN THYROID ASSOCIATION MANAGEMENT GUIDELINES FOR PATIENTS WITH THYROID NODULES AND DIFFERENTIATED THYROID CANCER)            Objective:   Physical Exam:  Vitals:  Ht 5' 10" (1.778 m)   Wt 93.8 kg (206 lb 12.7 oz)   BMI 29.67 kg/m²   General appearance:  Well developed, well nourished    Communication:  no hoarseness, no dysphonia    Ears:  Otoscopy of external auditory canals and tympanic membranes was normal, clinical speech reception thresholds grossly intact, no mass/lesion of auricle.  Nose:  No masses/lesions of external nose, nasal mucosa, septum, and turbinates were within normal limits.  Mouth:  No mass/lesion of lips, teeth, gums, hard/soft palate, tongue, tonsils, or oropharynx.      Neck & Lymphatics:  No cervical lymphadenopathy, no neck mass/crepitus/ asymmetry, trachea is midline.  THYROID: Firm nodule on left side extending below the clavicle, NTTP    DATA " REVIEWED    Scanned into media: US thyroid left sided TR5 nodule [id number]    [] On thyroid hormone medications (check if YES)  No results found for: TSH, FREET4, PTH, CALCIUM    ULTRASOUND:  No results found for this or any previous visit.    No results found for this or any previous visit.        PATHOLOGY:  none    Independent interpretation of thyroid ultrasound images:  No images available.  Outside report scanned in.       Assessment & Plan:   Humble was seen today for thyroid nodule.    Diagnoses and all orders for this visit:    Thyroid mass  -     TSH; Future  -     T4, Free; Future  -     US FNA Biopsy w/ Imaging 1st Lesion; Future    Substernal thyroid goiter      Humble has presents with a complex thyroid problem.  We discussed that a high percentage of thyroid nodules are benign, however, thyroid malignancy is a fairly common disease. Untreated thyroid malignancy if left untreated can pose a threat to life or bodily function. The imaging and laboratory values were reviewed at length.  Humble does not have compressive symptoms or cosmetic deformity from the size of the mass.  Humble does not have a HIGH RISK HISTORY for thyroid cancer.      Multiinstitutional Analysis of Thyroid Nodule Risk Stratification Using the American College of Radiology Thyroid Imaging Reporting and Data System    []  TR1 (0 POINTS): BENIGN  o no FNA required; risk of malignancy 0.3%  [] TR2  (2 POINTS): NOT SUSPICIOUS  o No FNA required; risk of malignancy 1.5%  [] TR3  (3 POINTS): MILDLY SUSPICIOUS; risk of malignancy 4.8%  o ?2.5 cm FNA  o ?1.5 cm follow up, follow up: 1, 3 and 5 years  [] TR4  (4-6 POINTS): MODERATELY SUSPICIOUS; risk of malignancy 9.1%  o ?1.5 cm FNA  o ?1.0 cm follow up, follow up: 1, 2, 3 and 5 years  [x] TR5  (7 OR MORE POINTS):  HIGHLY SUSPICIOUS; risk of malignancy 35%  o ?1.0 cm FNA  o ?0.5 cm follow up, annual follow up for up to 5 years      Humble has not had a prior biopsy    Bilateral nodules:  No  Nodules 3cm or greater: Yes    Based on a lengthy discussion of treatment options and the above information, my recommendation is TFT's and US Guided FNA of large left sided nodule.            Lincoln Graves MD, FACS [facility]   [facility]  [address]  [place] [address]  P: [phone]  F: [phone]

## 2022-01-05 NOTE — PROGRESS NOTES
"Referring Provider:    Aaareferral Self  No address on file  Subjective:   Patient: Humble Narvaez [id number], :1945   Visit date:2022 9:44 AM    Chief Complaint:  Thyroid Nodule (Review US and CT scan obtained externally in [place] )    HPI:  Humble is a 76 y.o. male who I was asked to see in consultation for evaluation of the following issue(s):    Incidentally found when having chest CT for concern of axillary adenopathy.  No adenopathy found.     COMPRESSIVE SYMPTOMS OR MINOR RISK FACTORS FOR THYROID CANCER (Negative unless checked):  []  Increasing in size over the past 6 months  []  Dysphagia   []  Dyspnea on exertion  []  Orthopnea  []  Hemoptysis  []  Voice changes  []  Pain  [x]  AGE >45    []  FEMALE     HIGH RISK HISTORY FOR THYROID CANCER:  []  Thyroid cancer in 1 or more first degree relatives  []  History of radiation exposure to the neck  []  Prior thyroidectomy with dx of thyroid cancer  []  PET positive nodule  []  Multiple Endocrine Neoplasia  []  Familial Medullary Thyroid Cancer    (2009 REVISED AMERICAN THYROID ASSOCIATION MANAGEMENT GUIDELINES FOR PATIENTS WITH THYROID NODULES AND DIFFERENTIATED THYROID CANCER)            Objective:   Physical Exam:  Vitals:  Ht 5' 10" (1.778 m)   Wt 93.8 kg (206 lb 12.7 oz)   BMI 29.67 kg/m²   General appearance:  Well developed, well nourished    Communication:  no hoarseness, no dysphonia    Ears:  Otoscopy of external auditory canals and tympanic membranes was normal, clinical speech reception thresholds grossly intact, no mass/lesion of auricle.  Nose:  No masses/lesions of external nose, nasal mucosa, septum, and turbinates were within normal limits.  Mouth:  No mass/lesion of lips, teeth, gums, hard/soft palate, tongue, tonsils, or oropharynx.      Neck & Lymphatics:  No cervical lymphadenopathy, no neck mass/crepitus/ asymmetry, trachea is midline.  THYROID: Firm nodule on left side extending below the clavicle, NTTP    DATA " REVIEWED    Scanned into media: US thyroid left sided TR5 nodule [id number]    [] On thyroid hormone medications (check if YES)  No results found for: TSH, FREET4, PTH, CALCIUM    ULTRASOUND:  No results found for this or any previous visit.    No results found for this or any previous visit.        PATHOLOGY:  none    Independent interpretation of thyroid ultrasound images:  No images available.  Outside report scanned in.       Assessment & Plan:   Humble was seen today for thyroid nodule.    Diagnoses and all orders for this visit:    Thyroid mass  -     TSH; Future  -     T4, Free; Future  -     US FNA Biopsy w/ Imaging 1st Lesion; Future    Substernal thyroid goiter      Humble has presents with a complex thyroid problem.  We discussed that a high percentage of thyroid nodules are benign, however, thyroid malignancy is a fairly common disease. Untreated thyroid malignancy if left untreated can pose a threat to life or bodily function. The imaging and laboratory values were reviewed at length.  Humble does not have compressive symptoms or cosmetic deformity from the size of the mass.  Humble does not have a HIGH RISK HISTORY for thyroid cancer.      Multiinstitutional Analysis of Thyroid Nodule Risk Stratification Using the American College of Radiology Thyroid Imaging Reporting and Data System    []  TR1 (0 POINTS): BENIGN  o no FNA required; risk of malignancy 0.3%  [] TR2  (2 POINTS): NOT SUSPICIOUS  o No FNA required; risk of malignancy 1.5%  [] TR3  (3 POINTS): MILDLY SUSPICIOUS; risk of malignancy 4.8%  o ?2.5 cm FNA  o ?1.5 cm follow up, follow up: 1, 3 and 5 years  [] TR4  (4-6 POINTS): MODERATELY SUSPICIOUS; risk of malignancy 9.1%  o ?1.5 cm FNA  o ?1.0 cm follow up, follow up: 1, 2, 3 and 5 years  [x] TR5  (7 OR MORE POINTS):  HIGHLY SUSPICIOUS; risk of malignancy 35%  o ?1.0 cm FNA  o ?0.5 cm follow up, annual follow up for up to 5 years      Humble has not had a prior biopsy    Bilateral nodules:  No  Nodules 3cm or greater: Yes    Based on a lengthy discussion of treatment options and the above information, my recommendation is TFT's and US Guided FNA of large left sided nodule.            Lincoln Graves MD, FACS [facility]   [facility]  [address]  [place] [address]  P: [phone]  F: [phone]

## 2022-01-12 ENCOUNTER — HOSPITAL ENCOUNTER (OUTPATIENT)
Dept: RADIOLOGY | Facility: HOSPITAL | Age: 77
Discharge: HOME | End: 2022-01-12
Attending: OTOLARYNGOLOGY
Payer: MEDICARE

## 2022-01-12 DIAGNOSIS — E07.9 THYROID MASS: ICD-10-CM

## 2022-01-12 PROCEDURE — 10005 FNA BX W/US GDN 1ST LES: CPT

## 2022-01-12 PROCEDURE — 88173 CYTOPATH EVAL FNA REPORT: CPT | Performed by: STUDENT IN AN ORGANIZED HEALTH CARE EDUCATION/TRAINING PROGRAM

## 2022-01-12 PROCEDURE — 88173 CYTOPATH EVAL FNA REPORT: CPT | Mod: 26,,, | Performed by: STUDENT IN AN ORGANIZED HEALTH CARE EDUCATION/TRAINING PROGRAM

## 2022-01-12 PROCEDURE — 88173 PR  INTERPRETATION OF FNA SMEAR: ICD-10-PCS | Mod: 26,,, | Performed by: STUDENT IN AN ORGANIZED HEALTH CARE EDUCATION/TRAINING PROGRAM

## 2022-01-12 NOTE — DISCHARGE SUMMARY
Pre Op Diagnosis: left thyroid nodule     Post Op Diagnosis: same     Procedure:  FNA     Procedure performed by: Greg RYAN, Wai CHIN     Written Informed Consent Obtained: Yes     Specimen Removed:  yes     Estimated Blood Loss:  minimal     Findings: Local anesthesia     The patient tolerated the procedure well and there were no complications.      Sterile technique was performed in the anterior left neck, lidocaine was used as a local anesthetic.  Multiple samples taken from the left thyroid nodule.  Pt tolerated the procedure well without immediate complications.  Please see radiologist report for details. F/u with PCP and/or ordering physician.

## 2022-01-18 ENCOUNTER — TELEPHONE (OUTPATIENT)
Dept: OTOLARYNGOLOGY | Facility: CLINIC | Age: 77
End: 2022-01-18
Payer: MEDICARE

## 2022-01-18 DIAGNOSIS — R22.1 NECK MASS: Primary | ICD-10-CM

## 2022-01-18 LAB
FINAL PATHOLOGIC DIAGNOSIS: ABNORMAL
Lab: ABNORMAL

## 2022-01-19 NOTE — TELEPHONE ENCOUNTER
Contacted patient per verbal conversation with Dr. Graves of concerning results and the recommended CT scan of Neck. Scheduled CT scan and stat creatinine for 01/27/22 at the [facility] and follow up to review results on 02/02/22. Patient verbalized understanding of all instructions.

## 2022-01-27 ENCOUNTER — HOSPITAL ENCOUNTER (OUTPATIENT)
Dept: RADIOLOGY | Facility: HOSPITAL | Age: 77
Discharge: HOME | End: 2022-01-27
Attending: OTOLARYNGOLOGY
Payer: MEDICARE

## 2022-01-27 DIAGNOSIS — R22.1 NECK MASS: ICD-10-CM

## 2022-01-27 PROCEDURE — 70491 CT SOFT TISSUE NECK W/DYE: CPT | Mod: TC

## 2022-01-27 PROCEDURE — 25500020 PHARM REV CODE 255: Performed by: OTOLARYNGOLOGY

## 2022-01-27 RX ADMIN — IOHEXOL 100 ML: 350 INJECTION, SOLUTION INTRAVENOUS at 03:01

## 2022-01-31 NOTE — PROGRESS NOTES
"Referring Provider:    No referring provider defined for this encounter.  Subjective:   Patient: Humble Narvaez [id number], :1945   Visit date:2022 9:44 AM    Chief Complaint:  Follow-up (Review CT and patho)    HPI:  Humble is a 76 y.o. male who I was asked to see in consultation for evaluation of the following issue(s):    Incidentally found when having chest CT for concern of axillary adenopathy.  No adenopathy found.     COMPRESSIVE SYMPTOMS OR MINOR RISK FACTORS FOR THYROID CANCER (Negative unless checked):  []  Increasing in size over the past 6 months  []  Dysphagia   []  Dyspnea on exertion  []  Orthopnea  []  Hemoptysis  []  Voice changes  []  Pain  [x]  AGE >45    []  FEMALE     HIGH RISK HISTORY FOR THYROID CANCER:  []  Thyroid cancer in 1 or more first degree relatives  []  History of radiation exposure to the neck  []  Prior thyroidectomy with dx of thyroid cancer  []  PET positive nodule  []  Multiple Endocrine Neoplasia  []  Familial Medullary Thyroid Cancer    (2009 REVISED [employer] MANAGEMENT GUIDELINES FOR PATIENTS WITH THYROID NODULES AND DIFFERENTIATED THYROID CANCER)      Objective:   Physical Exam:  Vitals:  Ht 5' 10" (1.778 m)   Wt 93.2 kg (205 lb 7.5 oz)   BMI 29.48 kg/m²   General appearance:  Well developed, well nourished    Communication:  no hoarseness, no dysphonia    Ears:  Otoscopy of external auditory canals and tympanic membranes was normal, clinical speech reception thresholds grossly intact, no mass/lesion of auricle.  Nose:  No masses/lesions of external nose, nasal mucosa, septum, and turbinates were within normal limits.  Mouth:  No mass/lesion of lips, teeth, gums, hard/soft palate, tongue, tonsils, or oropharynx.      Neck & Lymphatics:  No cervical lymphadenopathy, no neck mass/crepitus/ asymmetry, trachea is midline.  THYROID: Firm nodule on left side extending below the clavicle, NTTP    DATA REVIEWED    Scanned into media: US thyroid left " sided TR5 nodule [id number]    [] On thyroid hormone medications (check if YES)  Lab Results   Component Value Date    TSH 2.042 01/05/2022    FREET4 1.10 01/05/2022    CALCIUM 9.4 02/10/2022       ULTRASOUND:  No results found for this or any previous visit.    No results found for this or any previous visit.        CT SOFT TISSUE NECK WITH CONTRAST   CLINICAL HISTORY:  Localized swelling, mass and lump, neckNeck mass, nonpulsatile;     TECHNIQUE:  Axial CT imaging was performed of the soft tissues of the neck following intravenous saturation of 75 cc of Isovue 370 contrast.  Multiplanar reformats were performed and interpreted.     COMPARISON:  None     FINDINGS:  The visualized paranasal sinuses are clear.     Nasopharynx, oropharynx, hypopharynx, and larynx are within normal limits.     Bilateral submandibular and parotid glands are symmetric without evidence of mass or adjacent inflammation.     No evidence of cervical or supraclavicular lymphadenopathy.     There is a large hypodense mass in the left thyroid lobe measuring 4.1 x 5.4 x 5.9 cm resulting in mass effect upon the trachea and esophagus which are deviated to the right.  The right thyroid lobe is unremarkable.     The vascular structures are widely patent.  Carotid artery calcifications bilaterally.  Aortic atherosclerosis.     The lung apices are clear.     Advanced degenerative disc disease at C5-6 and C6-7 with facet DJD.  Age-indeterminate mild superior endplate fracture of the T2 vertebral body.     Impression:     Left thyroid mass.  Correlate with fine-needle aspiration results from 01/12/2022.  No evidence of lymphadenopathy or vascular abnormality.     Age-indeterminate mild superior endplate fracture of the T2 vertebral body.     All CT scans at this facility are performed  using dose modulation techniques as appropriate to performed exam including the following:  automated exposure control; adjustment of mA and/or kV according to the  patients size (this includes techniques or standardized protocols for targeted exams where dose is matched to indication/reason for exam: i.e. extremities or head);  iterative reconstruction technique.        Electronically signed by: Roberto Velazco MD  Date:                                            01/27/2022          PATHOLOGY:      Independent interpretation of CT images:  6 cm left thyroid mass which extends to the right border across the isthmus.  There is moderate deviation of the larynx.  The IJ and IC are laterally displaced.  There is no radiographically pathologic adenopathy.  There is no clear extrathyroidal extension of the mass.        Assessment & Plan:   Humble was seen today for follow-up.    Diagnoses and all orders for this visit:    Preop testing  -     COVID-19 Routine Screening; Future    Papillary thyroid carcinoma  -     Case Request Operating Room: THYROIDECTOMY      Cancer Staging  Papillary thyroid carcinoma  Staging form: Thyroid - Differentiated, AJCC 8th Edition  - Clinical stage from 1/12/2022: Stage II (cT3a, cN0, cM0, Age at diagnosis: >= 55 years) - Signed by Lincoln Graves MD on 2/14/2022        Recommend total thyroidectomy and central neck dissection.          I explained the risks of thyroidectomy include, but are not limited to, infection, bleeding, scarring, failure to achieve the diagnosis, no evidence of cancer, recurrence, collection of blood or tissue fluid requiring drainage, injury to the recurrent laryngeal nerve with resultant temporary or permanent hoarseness (1% permanent risk with up to 10% temporary risk, greater in revision operations), injury to the superior laryngeal nerve with resultant loss of the upper register for singing or challenges with yelling, temporary or permanent hypocalcemia related to injury or devascularization of the parathyroid glands (less than 5% permanent, up to 30-60% when paratracheal dissection is accomplished, again greater in revision  operations), and the need for additional procedures or therapies. Time was allowed for questions, and all questions were answered to the patient's apparent satisfaction. The risks of paratracheal lymph node dissection are included above. Informed consent was obtained.    he is aware that, if malignancy is confirmed, then he may require additional therapy.      Follows with Dr. Ryne Melo - cardiologist            Lincoln Graves MD, FACS [facility]   [facility]  [address]  [place] [address]  P: [phone]  F: [phone]

## 2022-01-31 NOTE — H&P (VIEW-ONLY)
"Referring Provider:    No referring provider defined for this encounter.  Subjective:   Patient: Humble Narvaez [id number], :1945   Visit date:2022 9:44 AM    Chief Complaint:  Follow-up (Review CT and patho)    HPI:  Humble is a 76 y.o. male who I was asked to see in consultation for evaluation of the following issue(s):    Incidentally found when having chest CT for concern of axillary adenopathy.  No adenopathy found.     COMPRESSIVE SYMPTOMS OR MINOR RISK FACTORS FOR THYROID CANCER (Negative unless checked):  []  Increasing in size over the past 6 months  []  Dysphagia   []  Dyspnea on exertion  []  Orthopnea  []  Hemoptysis  []  Voice changes  []  Pain  [x]  AGE >45    []  FEMALE     HIGH RISK HISTORY FOR THYROID CANCER:  []  Thyroid cancer in 1 or more first degree relatives  []  History of radiation exposure to the neck  []  Prior thyroidectomy with dx of thyroid cancer  []  PET positive nodule  []  Multiple Endocrine Neoplasia  []  Familial Medullary Thyroid Cancer    (2009 REVISED [employer] MANAGEMENT GUIDELINES FOR PATIENTS WITH THYROID NODULES AND DIFFERENTIATED THYROID CANCER)      Objective:   Physical Exam:  Vitals:  Ht 5' 10" (1.778 m)   Wt 93.2 kg (205 lb 7.5 oz)   BMI 29.48 kg/m²   General appearance:  Well developed, well nourished    Communication:  no hoarseness, no dysphonia    Ears:  Otoscopy of external auditory canals and tympanic membranes was normal, clinical speech reception thresholds grossly intact, no mass/lesion of auricle.  Nose:  No masses/lesions of external nose, nasal mucosa, septum, and turbinates were within normal limits.  Mouth:  No mass/lesion of lips, teeth, gums, hard/soft palate, tongue, tonsils, or oropharynx.      Neck & Lymphatics:  No cervical lymphadenopathy, no neck mass/crepitus/ asymmetry, trachea is midline.  THYROID: Firm nodule on left side extending below the clavicle, NTTP    DATA REVIEWED    Scanned into media: US thyroid left " sided TR5 nodule [id number]    [] On thyroid hormone medications (check if YES)  Lab Results   Component Value Date    TSH 2.042 01/05/2022    FREET4 1.10 01/05/2022    CALCIUM 9.4 02/10/2022       ULTRASOUND:  No results found for this or any previous visit.    No results found for this or any previous visit.        CT SOFT TISSUE NECK WITH CONTRAST   CLINICAL HISTORY:  Localized swelling, mass and lump, neckNeck mass, nonpulsatile;     TECHNIQUE:  Axial CT imaging was performed of the soft tissues of the neck following intravenous saturation of 75 cc of Isovue 370 contrast.  Multiplanar reformats were performed and interpreted.     COMPARISON:  None     FINDINGS:  The visualized paranasal sinuses are clear.     Nasopharynx, oropharynx, hypopharynx, and larynx are within normal limits.     Bilateral submandibular and parotid glands are symmetric without evidence of mass or adjacent inflammation.     No evidence of cervical or supraclavicular lymphadenopathy.     There is a large hypodense mass in the left thyroid lobe measuring 4.1 x 5.4 x 5.9 cm resulting in mass effect upon the trachea and esophagus which are deviated to the right.  The right thyroid lobe is unremarkable.     The vascular structures are widely patent.  Carotid artery calcifications bilaterally.  Aortic atherosclerosis.     The lung apices are clear.     Advanced degenerative disc disease at C5-6 and C6-7 with facet DJD.  Age-indeterminate mild superior endplate fracture of the T2 vertebral body.     Impression:     Left thyroid mass.  Correlate with fine-needle aspiration results from 01/12/2022.  No evidence of lymphadenopathy or vascular abnormality.     Age-indeterminate mild superior endplate fracture of the T2 vertebral body.     All CT scans at this facility are performed  using dose modulation techniques as appropriate to performed exam including the following:  automated exposure control; adjustment of mA and/or kV according to the  patients size (this includes techniques or standardized protocols for targeted exams where dose is matched to indication/reason for exam: i.e. extremities or head);  iterative reconstruction technique.        Electronically signed by: Roberto Velazco MD  Date:                                            01/27/2022          PATHOLOGY:      Independent interpretation of CT images:  6 cm left thyroid mass which extends to the right border across the isthmus.  There is moderate deviation of the larynx.  The IJ and IC are laterally displaced.  There is no radiographically pathologic adenopathy.  There is no clear extrathyroidal extension of the mass.        Assessment & Plan:   Humble was seen today for follow-up.    Diagnoses and all orders for this visit:    Preop testing  -     COVID-19 Routine Screening; Future    Papillary thyroid carcinoma  -     Case Request Operating Room: THYROIDECTOMY      Cancer Staging  Papillary thyroid carcinoma  Staging form: Thyroid - Differentiated, AJCC 8th Edition  - Clinical stage from 1/12/2022: Stage II (cT3a, cN0, cM0, Age at diagnosis: >= 55 years) - Signed by Lincoln Graves MD on 2/14/2022        Recommend total thyroidectomy and central neck dissection.          I explained the risks of thyroidectomy include, but are not limited to, infection, bleeding, scarring, failure to achieve the diagnosis, no evidence of cancer, recurrence, collection of blood or tissue fluid requiring drainage, injury to the recurrent laryngeal nerve with resultant temporary or permanent hoarseness (1% permanent risk with up to 10% temporary risk, greater in revision operations), injury to the superior laryngeal nerve with resultant loss of the upper register for singing or challenges with yelling, temporary or permanent hypocalcemia related to injury or devascularization of the parathyroid glands (less than 5% permanent, up to 30-60% when paratracheal dissection is accomplished, again greater in revision  operations), and the need for additional procedures or therapies. Time was allowed for questions, and all questions were answered to the patient's apparent satisfaction. The risks of paratracheal lymph node dissection are included above. Informed consent was obtained.    he is aware that, if malignancy is confirmed, then he may require additional therapy.      Follows with Dr. Ryne Melo - cardiologist            Lincoln Graves MD, FACS [facility]   [facility]  [address]  [place] [address]  P: [phone]  F: [phone]

## 2022-02-02 ENCOUNTER — OFFICE VISIT (OUTPATIENT)
Dept: OTOLARYNGOLOGY | Facility: CLINIC | Age: 77
End: 2022-02-02
Payer: MEDICARE

## 2022-02-02 VITALS — WEIGHT: 205.5 LBS | HEIGHT: 70 IN | BODY MASS INDEX: 29.42 KG/M2

## 2022-02-02 DIAGNOSIS — C73 PAPILLARY THYROID CARCINOMA: ICD-10-CM

## 2022-02-02 DIAGNOSIS — Z01.818 PREOP TESTING: Primary | ICD-10-CM

## 2022-02-02 PROCEDURE — 99215 OFFICE O/P EST HI 40 MIN: CPT | Mod: PBBFAC | Performed by: OTOLARYNGOLOGY

## 2022-02-02 PROCEDURE — 99999 PR PBB SHADOW E&M-EST. PATIENT-LVL V: ICD-10-PCS | Mod: PBBFAC,,, | Performed by: OTOLARYNGOLOGY

## 2022-02-02 PROCEDURE — 99215 PR OFFICE/OUTPT VISIT, EST, LEVL V, 40-54 MIN: ICD-10-PCS | Mod: S$PBB,,, | Performed by: OTOLARYNGOLOGY

## 2022-02-02 PROCEDURE — 99999 PR PBB SHADOW E&M-EST. PATIENT-LVL V: CPT | Mod: PBBFAC,,, | Performed by: OTOLARYNGOLOGY

## 2022-02-02 PROCEDURE — 99215 OFFICE O/P EST HI 40 MIN: CPT | Mod: S$PBB,,, | Performed by: OTOLARYNGOLOGY

## 2022-02-10 ENCOUNTER — DOCUMENTATION ONLY (OUTPATIENT)
Dept: PREADMISSION TESTING | Facility: HOSPITAL | Age: 77
End: 2022-02-10
Payer: MEDICARE

## 2022-02-10 ENCOUNTER — HOSPITAL ENCOUNTER (OUTPATIENT)
Dept: PREADMISSION TESTING | Facility: HOSPITAL | Age: 77
Discharge: HOME | End: 2022-02-10
Attending: OTOLARYNGOLOGY
Payer: MEDICARE

## 2022-02-10 VITALS
TEMPERATURE: 98 F | HEART RATE: 58 BPM | WEIGHT: 205 LBS | OXYGEN SATURATION: 97 % | SYSTOLIC BLOOD PRESSURE: 145 MMHG | RESPIRATION RATE: 20 BRPM | HEIGHT: 70 IN | BODY MASS INDEX: 29.35 KG/M2 | DIASTOLIC BLOOD PRESSURE: 71 MMHG

## 2022-02-10 DIAGNOSIS — Z01.810 PREOP CARDIOVASCULAR EXAM: ICD-10-CM

## 2022-02-10 DIAGNOSIS — E11.9 TYPE 2 DIABETES MELLITUS WITHOUT COMPLICATION, WITHOUT LONG-TERM CURRENT USE OF INSULIN: ICD-10-CM

## 2022-02-10 DIAGNOSIS — Z01.818 PREOP EXAMINATION: Primary | ICD-10-CM

## 2022-02-10 PROBLEM — E07.9 THYROID MASS: Status: ACTIVE | Noted: 2022-02-10

## 2022-02-10 PROBLEM — I42.0 DILATED CARDIOMYOPATHY: Status: ACTIVE | Noted: 2022-02-10

## 2022-02-10 PROBLEM — E78.5 HLD (HYPERLIPIDEMIA): Status: ACTIVE | Noted: 2022-02-10

## 2022-02-10 PROBLEM — K21.9 GERD (GASTROESOPHAGEAL REFLUX DISEASE): Status: ACTIVE | Noted: 2022-02-10

## 2022-02-10 LAB
ALBUMIN SERPL BCP-MCNC: 4 G/DL (ref 3.5–5.2)
ALP SERPL-CCNC: 75 U/L (ref 55–135)
ALT SERPL W/O P-5'-P-CCNC: 27 U/L (ref 10–44)
ANION GAP SERPL CALC-SCNC: 8 MMOL/L (ref 8–16)
AST SERPL-CCNC: 32 U/L (ref 10–40)
BASOPHILS # BLD AUTO: 0.04 K/UL (ref 0–0.2)
BASOPHILS NFR BLD: 0.6 % (ref 0–1.9)
BILIRUB SERPL-MCNC: 0.8 MG/DL (ref 0.1–1)
BUN SERPL-MCNC: 22 MG/DL (ref 8–23)
CALCIUM SERPL-MCNC: 9.4 MG/DL (ref 8.7–10.5)
CHLORIDE SERPL-SCNC: 104 MMOL/L (ref 95–110)
CO2 SERPL-SCNC: 28 MMOL/L (ref 23–29)
CREAT SERPL-MCNC: 1.5 MG/DL (ref 0.5–1.4)
DIFFERENTIAL METHOD: ABNORMAL
EOSINOPHIL # BLD AUTO: 0.2 K/UL (ref 0–0.5)
EOSINOPHIL NFR BLD: 3.5 % (ref 0–8)
ERYTHROCYTE [DISTWIDTH] IN BLOOD BY AUTOMATED COUNT: 12.8 % (ref 11.5–14.5)
EST. GFR  (AFRICAN AMERICAN): 51 ML/MIN/1.73 M^2
EST. GFR  (NON AFRICAN AMERICAN): 44 ML/MIN/1.73 M^2
ESTIMATED AVG GLUCOSE: 111 MG/DL (ref 68–131)
GLUCOSE SERPL-MCNC: 121 MG/DL (ref 70–110)
HBA1C MFR BLD: 5.5 % (ref 4–5.6)
HCT VFR BLD AUTO: 43.3 % (ref 40–54)
HGB BLD-MCNC: 15.1 G/DL (ref 14–18)
IMM GRANULOCYTES # BLD AUTO: 0.02 K/UL (ref 0–0.04)
IMM GRANULOCYTES NFR BLD AUTO: 0.3 % (ref 0–0.5)
LYMPHOCYTES # BLD AUTO: 1.6 K/UL (ref 1–4.8)
LYMPHOCYTES NFR BLD: 24.4 % (ref 18–48)
MCH RBC QN AUTO: 31.3 PG (ref 27–31)
MCHC RBC AUTO-ENTMCNC: 34.9 G/DL (ref 32–36)
MCV RBC AUTO: 90 FL (ref 82–98)
MONOCYTES # BLD AUTO: 0.6 K/UL (ref 0.3–1)
MONOCYTES NFR BLD: 8.6 % (ref 4–15)
NEUTROPHILS # BLD AUTO: 4.1 K/UL (ref 1.8–7.7)
NEUTROPHILS NFR BLD: 62.6 % (ref 38–73)
NRBC BLD-RTO: 0 /100 WBC
PLATELET # BLD AUTO: 197 K/UL (ref 150–450)
PMV BLD AUTO: 9.8 FL (ref 9.2–12.9)
POTASSIUM SERPL-SCNC: 4.4 MMOL/L (ref 3.5–5.1)
PROT SERPL-MCNC: 7.2 G/DL (ref 6–8.4)
RBC # BLD AUTO: 4.82 M/UL (ref 4.6–6.2)
SODIUM SERPL-SCNC: 140 MMOL/L (ref 136–145)
WBC # BLD AUTO: 6.48 K/UL (ref 3.9–12.7)

## 2022-02-10 PROCEDURE — 80053 COMPREHEN METABOLIC PANEL: CPT | Performed by: NURSE PRACTITIONER

## 2022-02-10 PROCEDURE — 85025 COMPLETE CBC W/AUTO DIFF WBC: CPT | Performed by: NURSE PRACTITIONER

## 2022-02-10 PROCEDURE — 83036 HEMOGLOBIN GLYCOSYLATED A1C: CPT | Performed by: NURSE PRACTITIONER

## 2022-02-10 NOTE — DISCHARGE INSTRUCTIONS
To confirm, Your doctor has instructed you that surgery is scheduled for 2/15/2022.       [facility]      Pre admit office will call afternoon prior to surgery with final arrival time.    INSTRUCTIONS IMPORTANT!!!     Do not eat, drink, or smoke after 12 midnight-including water. OK to brush teeth, no gum, candy or mints!    ¨ Take only these medicines with a small swallow of water-morning of surgery.    Coreg, Nancy and Prilosec    Pre operative instructions:  Please review the Pre-Operative Instruction booklet that you were given.        Bathing Instructions--See page 6 in the Pre-operative booklet.      Prevention of surgical site infections:     -Keep incisions clean and dry.   -Do not soak/submerge incisions in water until completely healed.   -Do not apply lotions, powders, creams, or deodorants to site.   -Always make sure hands are cleaned with antibacterial soap/ alcohol-based  prior to touching the surgical site.  (This includes doctors, nurses, staff, and yourself.)    Signs and symptoms:   -Redness and pain around the area where you had surgery   -Drainage of cloudy fluid from your surgical wound   -Fever over 100.4       I have read or had read and explained to me, and understand the above information.  Additional comments or instructions:  Received a copy of Pre-operative instructions booklet, FAQ surgical site infection sheet, and packets of hibiclens (if indicated).

## 2022-02-10 NOTE — ASSESSMENT & PLAN NOTE
- Patient presents today at the request of Dr. Graves who plans on performing a Thyroidectomy on 2/15.     Known risk factors for perioperative complications: Coronary disease  Diabetes mellitus    Dilated Cardiomyopathy  H/o Afib s/p ablation    Difficulty with intubation is not anticipated.    Cardiac Risk Estimation: Based on the Revised Cardiac Risk Index, patient is a Class 1 risk with a 3.9% risk of a major cardiac event in a moderate risk procedure.     1.) Preoperative workup as follows: chest x-ray, ECG, hemoglobin, hematocrit, electrolytes, creatinine, glucose, liver function studies.  2.) Change in medication regimen before surgery: discontinue ASA 6 days before surgery, discontinue NSAIDs 5 days before surgery, and to hold home Glipizide the morning of surgery to avoid hypoglycemia associated with NPO status.   3.) Prophylaxis for cardiac events with perioperative beta-blockers: Continue home Coreg.  4.) Invasive hemodynamic monitoring perioperatively: not indicated.  5.) Deep vein thrombosis prophylaxis postoperatively: intermittent pneumatic compression boots and regimen to be chosen by surgical team.  6.) Surveillance for postoperative MI with ECG immediately postoperatively and on postoperati ve days 1 and 2 AND troponin levels 24 hours postoperatively and on day 4 or hospital discharge (whichever comes first): not indicated.  7.) Current medications which may produce withdrawal symptoms if withheld perioperatively: None.  8.) Other measures: None.

## 2022-02-10 NOTE — ASSESSMENT & PLAN NOTE
- ECHO in August of 2021 showed normal EF of 55-65%.  - Followed outpatient by Dr. Melo and cleared on 1/25 for upcoming surgery with no further cardiac w/u indicated.

## 2022-02-10 NOTE — ASSESSMENT & PLAN NOTE
- Followed outpatient by Dr. Melo at [facility].  - Continue rate control with Coreg.  - No longer on Amiodarone or OAC s/p ablation with resolution of Afib.  - Cleared by Dr. Melo on 1/25 for upcoming surgery with no further cardiac w/u indicated.

## 2022-02-10 NOTE — H&P
Preoperative History and Physical  [facility]                                                                   Chief Complaint: Preoperative evaluation     History of Present Illness:      Humble Narvaez is a 76 y.o. male with a PMHx of Afib s/p ablation with no recurrence, CAD, Dilated cardiomyopathy, Arthritis, DM, HTN, and Thyroid mass who presents to the office today for a preoperative consultation at the request of Dr. Graves who plans on performing a Thyroidectomy on February 15.      Functional Status:      The patient is able to climb a flight of stairs. The patient is able to ambulate without difficulty. The patient's functional status is not affected by the surgical problem. The patient's functional status is not affected by shortness of breath, chest pain, dyspnea on exertion and fatigue.    MET score greater than 4    Past Medical History:      Past Medical History:   Diagnosis Date    Anticoagulant long-term use     Arthritis     Atrial fibrillation     s/p Ablation x 2 with no recurrence.    Coronary artery disease     Diabetes mellitus     Dilated cardiomyopathy     GERD (gastroesophageal reflux disease) 2/10/2022    Hypertension     Thyroid disease          Past Surgical History:      Past Surgical History:   Procedure Laterality Date    BACK SURGERY      CARDIAC ELECTROPHYSIOLOGY MAPPING AND ABLATION      Follow[facility] outpatient by Dr[facility] Melo at [facility].    EYE SURGERY      TONSILLECTOMY          Social History:      Social History     Socioeconomic History    Marital status:    Tobacco Use    Smoking status: Former Smoker     Quit date: 3/31/1977     Years since quittin.8    Smokeless tobacco: Former User     Quit date: 1982   Substance and Sexual Activity    Alcohol use: No    Drug use: Never        Family History:      Family History   Problem Relation Age of Onset    Heart attack Father        Allergies:      Review of  patient's allergies indicates:   Allergen Reactions    Lima bean Hives    Pcn [penicillins] Swelling    Wasp sting [allergen ext-venom-honey bee] Anaphylaxis       Medications:      Current Outpatient Medications   Medication Sig    aspirin (ECOTRIN) 81 MG EC tablet Take 81 mg by mouth once daily.    carvedilol (COREG) 25 MG tablet Take 25 mg by mouth 2 (two) times daily with meals.    cyanocobalamin (VITAMIN B-12) 1000 MCG tablet Take 100 mcg by mouth once daily.    cyclobenzaprine (FLEXERIL) 10 MG tablet Take 10 mg by mouth 3 (three) times daily as needed for Muscle spasms.    fish oil-omega-3 fatty acids 300-1,000 mg capsule Take 2 g by mouth once daily.    folic acid (FOLVITE) 1 MG tablet Take 1 mg by mouth once daily.    glipiZIDE (GLUCOTROL) 5 MG TR24 Take 5 mg by mouth daily with breakfast.    multivitamin capsule Take 1 capsule by mouth once daily.    naproxen (NAPROSYN) 500 MG tablet Take 500 mg by mouth 2 (two) times daily with meals.    omeprazole (PRILOSEC) 20 MG capsule Take 2 capsules (40 mg total) by mouth once daily.    pravastatin (PRAVACHOL) 40 MG tablet Take 40 mg by mouth every evening.     pyridoxine, vitamin B6, (B-6) 100 MG Tab Take 50 mg by mouth once daily.    tamsulosin (FLOMAX) 0.4 mg Cap Take 0.4 mg by mouth every evening.     telmisartan (MICARDIS) 40 MG Tab Take by mouth once daily.    traZODone (DESYREL) 50 MG tablet Take 50 mg by mouth every evening.    acetaminophen (TYLENOL) 325 MG tablet Take 325 mg by mouth every 6 (six) hours as needed for Pain.    amiodarone (PACERONE) 200 MG Tab Take 2 tablets twice a day for 2 weeks, then take 1 tablet daily    amitriptyline (ELAVIL) 25 MG tablet Take 25 mg by mouth nightly as needed for Insomnia.    amitriptyline (ELAVIL) 50 MG tablet Take 50 mg by mouth nightly.    doxazosin (CARDURA) 8 MG Tab Take 4 mg by mouth every evening.      No current facility-administered medications for this encounter.       Vitals:       Vitals:    02/10/22 1012   BP: (!) 145/71   Pulse: (!) 58   Resp: 20   Temp: 97.9 °F (36.6 °C)       Review of Systems:        Constitutional: Negative for fever, chills, weight loss, malaise/fatigue and diaphoresis.   HENT: Negative for hearing loss, ear pain, nosebleeds, congestion, sore throat, neck pain, tinnitus and ear discharge.    Eyes: Negative for blurred vision, double vision, photophobia, pain, discharge and redness.   Respiratory: Negative for cough, hemoptysis, sputum production, shortness of breath, wheezing and stridor.    Cardiovascular: Negative for chest pain, palpitations, orthopnea, claudication, leg swelling and PND.   Gastrointestinal: Negative for heartburn, nausea, vomiting, abdominal pain, diarrhea, constipation, blood in stool and melena.   Genitourinary: Negative for dysuria, urgency, frequency, hematuria and flank pain.   Musculoskeletal: Negative for myalgias, back pain, joint pain and falls.   Skin: Negative for itching and rash.   Neurological: Negative for dizziness, tingling, tremors, sensory change, speech change, focal weakness, seizures, loss of consciousness, weakness and headaches.   Endo/Heme/Allergies: Negative for environmental allergies and polydipsia. Does not bruise/bleed easily.   Psychiatric/Behavioral: Negative for depression, suicidal ideas, hallucinations, memory loss and substance abuse. The patient is not nervous/anxious and does not have insomnia.    All 14 systems reviewed and negative except as noted above.    Physical Exam:      Constitutional: Appears well-developed, well-nourished and in no acute distress.  Patient is oriented to person, place, and time. Healthy, CM resting comfortably in NAD.  Head: Normocephalic and atraumatic. Mucous membranes moist.  Neck: Neck supple no mass.   Cardiovascular: Normal rate. Bradycardia. S1 S2 appreciated by ascultation.  Pulmonary/Chest: Effort normal and clear to auscultation bilaterally. No respiratory distress.    Abdomen: Soft. Non-tender and non-distended. Bowel sounds are normal.   Neurological: Patient is alert and oriented to person, place and time. Moves all extremities.  Skin: Warm and dry. No lesions.  Extremities: No clubbing, cyanosis or edema.    Laboratory data:      Reviewed and noted in plan where applicable. Please see chart for full laboratory data.    No results for input(s): CPK, CPKMB, TROPONINI, MB in the last 24 hours. No results for input(s): POCTGLUCOSE in the last 24 hours.     Lab Results   Component Value Date    INR 2.7 (H) 05/16/2017       No results found for: WBC, HGB, HCT, MCV, PLT    No results for input(s): GLU, NA, K, CL, CO2, BUN, CREATININE, CALCIUM, MG in the last 24 hours.    Predictors of intubation difficulty:       Morbid obesity? no   Anatomically abnormal facies? no   Prominent incisors? no   Receding mandible? no   Short, thick neck? no   Neck range of motion: normal   Dentition: Full upper dentures, multiple missing teeth to lower.   Based on the Modified Mallampati, patient is a mallampati score: I (soft palate, uvula, fauces, and tonsillar pillars visible)    Cardiographics:      ECG on 1/25/2022  Normal sinus rhythm   Compared to ECG 08/27/2021 09:30:37   Ventricular premature complex(es) no longer present.    Echocardiogram in August of 2021 showed normal EF.    Imaging:      CT on 1/19 There is a large hypodense mass in the left thyroid lobe measuring 4.1 x 5.4 x 5.9 cm resulting in mass effect upon the trachea and esophagus which are deviated to the right.  The right thyroid lobe is unremarkable. Lung apices are clear.    Assessment and Plan:      Thyroid mass  - Patient presents today at the request of Dr. Graves who plans on performing a Thyroidectomy on 2/15.     Known risk factors for perioperative complications: Coronary disease  Diabetes mellitus    Dilated Cardiomyopathy  H/o Afib s/p ablation    Difficulty with intubation is not anticipated.    Cardiac Risk  Estimation: Based on the Revised Cardiac Risk Index, patient is a Class 1 risk with a 3.9% risk of a major cardiac event in a moderate risk procedure.     1.) Preoperative workup as follows: chest x-ray, ECG, hemoglobin, hematocrit, electrolytes, creatinine, glucose, liver function studies.  2.) Change in medication regimen before surgery: discontinue ASA 6 days before surgery, discontinue NSAIDs 5 days before surgery, and to hold home Glipizide the morning of surgery to avoid hypoglycemia associated with NPO status.   3.) Prophylaxis for cardiac events with perioperative beta-blockers: Continue home Coreg.  4.) Invasive hemodynamic monitoring perioperatively: not indicated.  5.) Deep vein thrombosis prophylaxis postoperatively: intermittent pneumatic compression boots and regimen to be chosen by surgical team.  6.) Surveillance for postoperative MI with ECG immediately postoperatively and on postoperati ve days 1 and 2 AND troponin levels 24 hours postoperatively and on day 4 or hospital discharge (whichever comes first): not indicated.  7.) Current medications which may produce withdrawal symptoms if withheld perioperatively: None.  8.) Other measures: None.    Atrial fibrillation  - Followed outpatient by Dr. Melo at [facility].  - Continue rate control with Coreg.  - No longer on Amiodarone or OAC s/p ablation with resolution of Afib.  - Cleared by Dr. Melo on 1/25 for upcoming surgery with no further cardiac w/u indicated.     Dilated cardiomyopathy  - ECHO in August of 2021 showed normal EF of 55-65%.  - Followed outpatient by Dr. Melo and cleared on 1/25 for upcoming surgery with no further cardiac w/u indicated.     Type 2 diabetes mellitus without complication, without long-term current use of insulin  - No recent A1c on file for review.  - A1c pending, will need to be 8.5 or less to proceed with surgery.  - Patient was instructed to continue home Glipizide for now, but to hold the morning of surgery to avoid  hypoglycemia associated with NPO status.    Essential hypertension  - BP well controlled.  - Continue home Coreg and Micardis.    HLD (hyperlipidemia)  - Continue home Statin.    GERD (gastroesophageal reflux disease)  - Continue home PPI.          Electronically signed by Bree Maki DNP, STIVENP on 2/10/2022 at 10:35 AM.

## 2022-02-10 NOTE — ASSESSMENT & PLAN NOTE
- No recent A1c on file for review.  - A1c pending, will need to be 8.5 or less to proceed with surgery.  - Patient was instructed to continue home Glipizide for now, but to hold the morning of surgery to avoid hypoglycemia associated with NPO status.

## 2022-02-10 NOTE — PROGRESS NOTES
Pre-op CMP showed Cr of 1.5, GFR of 51.  Per review of Care Everywhere, patient is noted to have a h/o mild elevation dating back to March of 2019 where he was noted to have a Cr of 1.33.      Discussed results with patient's PCP in [place] who reports highest Cr on file there is 1.4.  Discussed results with patient's son Roberto, who is aware of mild elevation, and the need to f/u with his PCP within a few weeks to recheck levels and undergo further w/u and/or monitoring as indicated, verbalized + understanding..  Case was also d/w dionne Henderson to proceed with Thyroidectomy on 2/15 as scheduled.

## 2022-02-14 PROBLEM — C73 PAPILLARY THYROID CARCINOMA: Status: ACTIVE | Noted: 2022-02-14

## 2022-02-18 ENCOUNTER — TELEPHONE (OUTPATIENT)
Dept: PREADMISSION TESTING | Facility: HOSPITAL | Age: 77
End: 2022-02-18
Payer: MEDICARE

## 2022-02-18 ENCOUNTER — ANESTHESIA EVENT (OUTPATIENT)
Dept: SURGERY | Facility: HOSPITAL | Age: 77
End: 2022-02-18
Payer: MEDICARE

## 2022-02-18 NOTE — TELEPHONE ENCOUNTER
Called and spoke with the patient and patient son about the following:    Your Surgery arrival time is at 1130am on 2/21/22 at [facility].    The address is [id number] [place]  [address].     Only one adult (over 18) is to accompany you to surgery, unless it is a Pediatric patient, then 2 adults are encouraged to accompany them to the surgery center.    Your ride MUST STAY the entire time until you are discharged.      Please come in the main lobby (located on the 1st floor).     Be prepared to show your photo ID and insurance card.     Masks should be worn by ALL persons entering the building.     Nothing to eat or drink after after midnight, unless you were instructed to take specific medications discussed with the Pre-admit Nurse.     Please call [phone] with any questions or concerns.     Thanks.

## 2022-02-18 NOTE — ANESTHESIA PREPROCEDURE EVALUATION
02/21/2022  Humble Narvaez is a 76 y.o., male.  Past Medical History:   Diagnosis Date    Anticoagulant long-term use     Arthritis     Atrial fibrillation     s/p Ablation x 2 with no recurrence.    Coronary artery disease     Diabetes mellitus     Dilated cardiomyopathy     GERD (gastroesophageal reflux disease) 2/10/2022    Hypertension     Papillary thyroid carcinoma 2/14/2022    Thyroid disease      Past Surgical History:   Procedure Laterality Date    BACK SURGERY      CARDIAC ELECTROPHYSIOLOGY MAPPING AND ABLATION      Followed outpatient by Dr. Melo at [facility].    EYE SURGERY      TONSILLECTOMY         Anesthesia Evaluation    I have reviewed the Patient Summary Reports.    I have reviewed the Nursing Notes. I have reviewed the NPO Status.   I have reviewed the Medications.     Review of Systems  Anesthesia Hx:  No problems with previous Anesthesia Grade 1 view with MAC 4, retrognathia. History of prior surgery of interest to airway management or planning: Previous anesthesia: General Denies Family Hx of Anesthesia complications.   Denies Personal Hx of Anesthesia complications.   Social:  No Alcohol Use, Non-Smoker    Hematology/Oncology:  Hematology Normal        Cardiovascular:   Hypertension CAD  Dysrhythmias atrial fibrillation ECG has been reviewed. H/O cardiomyopathy, h/o Afib.  Ablation x 2, no recurrence, off Amiodarone and off OAC's.  EF preserved at 55%.  Followed by vivian dover'd for this surgery.   Pulmonary:  Pulmonary Normal    Renal/:  Renal/ Normal     Hepatic/GI:   GERD, well controlled    Neurological:  Neurology Normal    Endocrine:   Diabetes, type 2 Thyroid CA.   Psych:  Psychiatric Normal           Physical Exam  General:  Well nourished    Airway/Jaw/Neck:  Airway Findings: General Airway Assessment: Adult Mallampati: II  Improves to II with phonation.  BULL  Distance: Normal, at least 6 cm   Jaw/Neck Findings:     Neck ROM: Normal ROM       Dental:  Dental Findings: Edentulous, Upper Dentures, In tact     Chest/Lungs:  Chest/Lungs Clear    Heart/Vascular:  Heart Findings: Normal       Mental Status:  Mental Status Findings:  Cooperative, Alert and Oriented         Anesthesia Plan  Type of Anesthesia, risks & benefits discussed:  Anesthesia Type:  general    Patient's Preference:   Plan Factors:          Intra-op Monitoring Plan: standard ASA monitors  Intra-op Monitoring Plan Comments:   Post Op Pain Control Plan: per primary service following discharge from PACU and multimodal analgesia  Post Op Pain Control Plan Comments:     Induction:   IV  Beta Blocker:  Patient is on a Beta-Blocker and has received one dose within the past 24 hours (No further documentation required).       Informed Consent: Patient understands risks and agrees with Anesthesia plan.  Questions answered. Anesthesia consent signed with patient.  ASA Score: 3     Day of Surgery Review of History & Physical:    H&P update referred to the surgeon.           Ready For Surgery From Anesthesia Perspective.

## 2022-02-21 ENCOUNTER — ANESTHESIA (OUTPATIENT)
Dept: SURGERY | Facility: HOSPITAL | Age: 77
End: 2022-02-21
Payer: MEDICARE

## 2022-02-21 ENCOUNTER — HOSPITAL ENCOUNTER (OUTPATIENT)
Facility: HOSPITAL | Age: 77
Discharge: HOME | End: 2022-02-22
Attending: OTOLARYNGOLOGY | Admitting: OTOLARYNGOLOGY
Payer: MEDICARE

## 2022-02-21 DIAGNOSIS — C73 PAPILLARY THYROID CARCINOMA: Primary | ICD-10-CM

## 2022-02-21 LAB
CALCIUM SERPL-MCNC: 9 MG/DL (ref 8.7–10.5)
MAGNESIUM SERPL-MCNC: 1.3 MG/DL (ref 1.6–2.6)
POCT GLUCOSE: 154 MG/DL (ref 70–110)
POCT GLUCOSE: 155 MG/DL (ref 70–110)
POCT GLUCOSE: 170 MG/DL (ref 70–110)
PTH-INTACT SERPL-MCNC: <5 PG/ML (ref 9–77)
SARS-COV-2 RNA NPH QL NAA+NON-PROBE: NOT DETECTED

## 2022-02-21 PROCEDURE — 27200702 HC TUBE,ENDO XOMED EMG: Performed by: STUDENT IN AN ORGANIZED HEALTH CARE EDUCATION/TRAINING PROGRAM

## 2022-02-21 PROCEDURE — 63600175 PHARM REV CODE 636 W HCPCS: Performed by: ANESTHESIOLOGY

## 2022-02-21 PROCEDURE — 25000003 PHARM REV CODE 250: Performed by: OTOLARYNGOLOGY

## 2022-02-21 PROCEDURE — 25000003 PHARM REV CODE 250: Performed by: NURSE ANESTHETIST, CERTIFIED REGISTERED

## 2022-02-21 PROCEDURE — 37000008 HC ANESTHESIA 1ST 15 MINUTES: Performed by: OTOLARYNGOLOGY

## 2022-02-21 PROCEDURE — D9220A PRA ANESTHESIA: ICD-10-PCS | Mod: ANES,,, | Performed by: STUDENT IN AN ORGANIZED HEALTH CARE EDUCATION/TRAINING PROGRAM

## 2022-02-21 PROCEDURE — 60252 PR THYROIDECTOMY,MALIG,LTD NECK SURG: ICD-10-PCS | Mod: AS,,, | Performed by: PHYSICIAN ASSISTANT

## 2022-02-21 PROCEDURE — D9220A PRA ANESTHESIA: ICD-10-PCS | Mod: CRNA,,, | Performed by: NURSE ANESTHETIST, CERTIFIED REGISTERED

## 2022-02-21 PROCEDURE — 27201423 OPTIME MED/SURG SUP & DEVICES STERILE SUPPLY: Performed by: OTOLARYNGOLOGY

## 2022-02-21 PROCEDURE — 36000706: Performed by: OTOLARYNGOLOGY

## 2022-02-21 PROCEDURE — 83735 ASSAY OF MAGNESIUM: CPT | Performed by: OTOLARYNGOLOGY

## 2022-02-21 PROCEDURE — C1729 CATH, DRAINAGE: HCPCS | Performed by: OTOLARYNGOLOGY

## 2022-02-21 PROCEDURE — 37000009 HC ANESTHESIA EA ADD 15 MINS: Performed by: OTOLARYNGOLOGY

## 2022-02-21 PROCEDURE — 63600175 PHARM REV CODE 636 W HCPCS: Performed by: NURSE ANESTHETIST, CERTIFIED REGISTERED

## 2022-02-21 PROCEDURE — 88342 CHG IMMUNOCYTOCHEMISTRY: ICD-10-PCS | Mod: 26,,, | Performed by: PATHOLOGY

## 2022-02-21 PROCEDURE — 82310 ASSAY OF CALCIUM: CPT | Performed by: OTOLARYNGOLOGY

## 2022-02-21 PROCEDURE — D9220A PRA ANESTHESIA: Mod: CRNA,,, | Performed by: NURSE ANESTHETIST, CERTIFIED REGISTERED

## 2022-02-21 PROCEDURE — 36000707: Performed by: OTOLARYNGOLOGY

## 2022-02-21 PROCEDURE — 88307 TISSUE EXAM BY PATHOLOGIST: CPT | Performed by: PATHOLOGY

## 2022-02-21 PROCEDURE — 82962 GLUCOSE BLOOD TEST: CPT | Performed by: OTOLARYNGOLOGY

## 2022-02-21 PROCEDURE — 88305 TISSUE EXAM BY PATHOLOGIST: CPT | Mod: 59 | Performed by: PATHOLOGY

## 2022-02-21 PROCEDURE — 88305 TISSUE EXAM BY PATHOLOGIST: CPT | Mod: 26,,, | Performed by: PATHOLOGY

## 2022-02-21 PROCEDURE — 88305 TISSUE EXAM BY PATHOLOGIST: ICD-10-PCS | Mod: 26,,, | Performed by: PATHOLOGY

## 2022-02-21 PROCEDURE — 88307 TISSUE EXAM BY PATHOLOGIST: CPT | Mod: 26,,, | Performed by: PATHOLOGY

## 2022-02-21 PROCEDURE — 88342 IMHCHEM/IMCYTCHM 1ST ANTB: CPT | Mod: 26,,, | Performed by: PATHOLOGY

## 2022-02-21 PROCEDURE — 83970 ASSAY OF PARATHORMONE: CPT | Performed by: OTOLARYNGOLOGY

## 2022-02-21 PROCEDURE — 88307 PR  SURG PATH,LEVEL V: ICD-10-PCS | Mod: 26,,, | Performed by: PATHOLOGY

## 2022-02-21 PROCEDURE — 63600175 PHARM REV CODE 636 W HCPCS: Performed by: OTOLARYNGOLOGY

## 2022-02-21 PROCEDURE — 71000033 HC RECOVERY, INTIAL HOUR: Performed by: OTOLARYNGOLOGY

## 2022-02-21 PROCEDURE — 60252 REMOVAL OF THYROID: CPT | Mod: AS,,, | Performed by: PHYSICIAN ASSISTANT

## 2022-02-21 PROCEDURE — 88342 IMHCHEM/IMCYTCHM 1ST ANTB: CPT | Performed by: PATHOLOGY

## 2022-02-21 PROCEDURE — 60252 REMOVAL OF THYROID: CPT | Mod: ,,, | Performed by: OTOLARYNGOLOGY

## 2022-02-21 PROCEDURE — 60252 PR THYROIDECTOMY,MALIG,LTD NECK SURG: ICD-10-PCS | Mod: ,,, | Performed by: OTOLARYNGOLOGY

## 2022-02-21 PROCEDURE — D9220A PRA ANESTHESIA: Mod: ANES,,, | Performed by: STUDENT IN AN ORGANIZED HEALTH CARE EDUCATION/TRAINING PROGRAM

## 2022-02-21 RX ORDER — EPHEDRINE SULFATE 50 MG/ML
INJECTION, SOLUTION INTRAVENOUS
Status: DISCONTINUED | OUTPATIENT
Start: 2022-02-21 | End: 2022-02-21

## 2022-02-21 RX ORDER — BUPIVACAINE HYDROCHLORIDE AND EPINEPHRINE 5; 5 MG/ML; UG/ML
INJECTION, SOLUTION EPIDURAL; INTRACAUDAL; PERINEURAL
Status: DISCONTINUED | OUTPATIENT
Start: 2022-02-21 | End: 2022-02-21

## 2022-02-21 RX ORDER — DEXAMETHASONE SODIUM PHOSPHATE 4 MG/ML
INJECTION, SOLUTION INTRA-ARTICULAR; INTRALESIONAL; INTRAMUSCULAR; INTRAVENOUS; SOFT TISSUE
Status: DISCONTINUED | OUTPATIENT
Start: 2022-02-21 | End: 2022-02-21

## 2022-02-21 RX ORDER — CLINDAMYCIN PHOSPHATE 900 MG/50ML
INJECTION, SOLUTION INTRAVENOUS
Status: DISCONTINUED | OUTPATIENT
Start: 2022-02-21 | End: 2022-02-21

## 2022-02-21 RX ORDER — SUCCINYLCHOLINE CHLORIDE 20 MG/ML
INJECTION INTRAMUSCULAR; INTRAVENOUS
Status: DISCONTINUED | OUTPATIENT
Start: 2022-02-21 | End: 2022-02-21

## 2022-02-21 RX ORDER — PRAVASTATIN SODIUM 10 MG/1
40 TABLET ORAL NIGHTLY
Status: DISCONTINUED | OUTPATIENT
Start: 2022-02-21 | End: 2022-02-22 | Stop reason: HOSPADM

## 2022-02-21 RX ORDER — FENTANYL CITRATE 50 UG/ML
25 INJECTION, SOLUTION INTRAMUSCULAR; INTRAVENOUS EVERY 5 MIN PRN
Status: DISCONTINUED | OUTPATIENT
Start: 2022-02-21 | End: 2022-02-22 | Stop reason: HOSPADM

## 2022-02-21 RX ORDER — AMITRIPTYLINE HYDROCHLORIDE 25 MG/1
25 TABLET, FILM COATED ORAL NIGHTLY PRN
Status: DISCONTINUED | OUTPATIENT
Start: 2022-02-21 | End: 2022-02-22 | Stop reason: RX

## 2022-02-21 RX ORDER — OXYCODONE HYDROCHLORIDE 5 MG/1
5 TABLET ORAL EVERY 4 HOURS PRN
Status: DISCONTINUED | OUTPATIENT
Start: 2022-02-21 | End: 2022-02-22 | Stop reason: HOSPADM

## 2022-02-21 RX ORDER — MAGNESIUM SULFATE HEPTAHYDRATE 40 MG/ML
2 INJECTION, SOLUTION INTRAVENOUS ONCE
Status: COMPLETED | OUTPATIENT
Start: 2022-02-21 | End: 2022-02-21

## 2022-02-21 RX ORDER — IBUPROFEN 200 MG
16 TABLET ORAL
Status: DISCONTINUED | OUTPATIENT
Start: 2022-02-21 | End: 2022-02-22 | Stop reason: HOSPADM

## 2022-02-21 RX ORDER — DOXAZOSIN 8 MG/1
8 TABLET ORAL NIGHTLY
Status: DISCONTINUED | OUTPATIENT
Start: 2022-02-21 | End: 2022-02-22 | Stop reason: RX

## 2022-02-21 RX ORDER — CLINDAMYCIN PHOSPHATE 900 MG/50ML
INJECTION, SOLUTION INTRAVENOUS
Status: COMPLETED
Start: 2022-02-21 | End: 2022-02-21

## 2022-02-21 RX ORDER — ACETAMINOPHEN 10 MG/ML
INJECTION, SOLUTION INTRAVENOUS
Status: DISCONTINUED | OUTPATIENT
Start: 2022-02-21 | End: 2022-02-21

## 2022-02-21 RX ORDER — IBUPROFEN 200 MG
24 TABLET ORAL
Status: DISCONTINUED | OUTPATIENT
Start: 2022-02-21 | End: 2022-02-22 | Stop reason: HOSPADM

## 2022-02-21 RX ORDER — SODIUM CHLORIDE 0.9 % (FLUSH) 0.9 %
10 SYRINGE (ML) INJECTION
Status: DISCONTINUED | OUTPATIENT
Start: 2022-02-21 | End: 2022-02-22 | Stop reason: HOSPADM

## 2022-02-21 RX ORDER — CALCITRIOL 0.25 UG/1
0.5 CAPSULE ORAL DAILY
Status: DISCONTINUED | OUTPATIENT
Start: 2022-02-21 | End: 2022-02-22 | Stop reason: HOSPADM

## 2022-02-21 RX ORDER — ONDANSETRON 2 MG/ML
INJECTION INTRAMUSCULAR; INTRAVENOUS
Status: DISCONTINUED | OUTPATIENT
Start: 2022-02-21 | End: 2022-02-21

## 2022-02-21 RX ORDER — PROPOFOL 10 MG/ML
VIAL (ML) INTRAVENOUS
Status: DISCONTINUED | OUTPATIENT
Start: 2022-02-21 | End: 2022-02-21

## 2022-02-21 RX ORDER — CALCIUM CARBONATE 200(500)MG
2000 TABLET,CHEWABLE ORAL 3 TIMES DAILY
Status: DISCONTINUED | OUTPATIENT
Start: 2022-02-21 | End: 2022-02-22 | Stop reason: HOSPADM

## 2022-02-21 RX ORDER — VALSARTAN 40 MG/1
80 TABLET ORAL DAILY
Status: DISCONTINUED | OUTPATIENT
Start: 2022-02-22 | End: 2022-02-22 | Stop reason: HOSPADM

## 2022-02-21 RX ORDER — ROCURONIUM BROMIDE 10 MG/ML
INJECTION, SOLUTION INTRAVENOUS
Status: DISCONTINUED | OUTPATIENT
Start: 2022-02-21 | End: 2022-02-21

## 2022-02-21 RX ORDER — ETOMIDATE 2 MG/ML
INJECTION INTRAVENOUS
Status: DISCONTINUED | OUTPATIENT
Start: 2022-02-21 | End: 2022-02-21

## 2022-02-21 RX ORDER — GLUCAGON 1 MG
1 KIT INJECTION
Status: DISCONTINUED | OUTPATIENT
Start: 2022-02-21 | End: 2022-02-22 | Stop reason: HOSPADM

## 2022-02-21 RX ORDER — TRAZODONE HYDROCHLORIDE 50 MG/1
50 TABLET ORAL NIGHTLY
Status: DISCONTINUED | OUTPATIENT
Start: 2022-02-21 | End: 2022-02-22 | Stop reason: HOSPADM

## 2022-02-21 RX ORDER — PANTOPRAZOLE SODIUM 40 MG/1
40 TABLET, DELAYED RELEASE ORAL DAILY
Status: DISCONTINUED | OUTPATIENT
Start: 2022-02-22 | End: 2022-02-22 | Stop reason: HOSPADM

## 2022-02-21 RX ORDER — FENTANYL CITRATE 50 UG/ML
INJECTION, SOLUTION INTRAMUSCULAR; INTRAVENOUS
Status: DISCONTINUED | OUTPATIENT
Start: 2022-02-21 | End: 2022-02-21

## 2022-02-21 RX ORDER — BUPIVACAINE HYDROCHLORIDE AND EPINEPHRINE 5; 5 MG/ML; UG/ML
INJECTION, SOLUTION EPIDURAL; INTRACAUDAL; PERINEURAL
Status: DISPENSED
Start: 2022-02-21 | End: 2022-02-22

## 2022-02-21 RX ORDER — SODIUM CHLORIDE, SODIUM LACTATE, POTASSIUM CHLORIDE, CALCIUM CHLORIDE 600; 310; 30; 20 MG/100ML; MG/100ML; MG/100ML; MG/100ML
INJECTION, SOLUTION INTRAVENOUS CONTINUOUS
Status: DISCONTINUED | OUTPATIENT
Start: 2022-02-21 | End: 2022-02-21

## 2022-02-21 RX ORDER — ONDANSETRON 2 MG/ML
8 INJECTION INTRAMUSCULAR; INTRAVENOUS EVERY 12 HOURS PRN
Status: DISCONTINUED | OUTPATIENT
Start: 2022-02-21 | End: 2022-02-22 | Stop reason: HOSPADM

## 2022-02-21 RX ORDER — OXYCODONE HYDROCHLORIDE 5 MG/1
10 TABLET ORAL EVERY 4 HOURS PRN
Status: DISCONTINUED | OUTPATIENT
Start: 2022-02-21 | End: 2022-02-22 | Stop reason: HOSPADM

## 2022-02-21 RX ORDER — ACETAMINOPHEN 325 MG/1
650 TABLET ORAL EVERY 4 HOURS PRN
Status: DISCONTINUED | OUTPATIENT
Start: 2022-02-21 | End: 2022-02-22 | Stop reason: HOSPADM

## 2022-02-21 RX ORDER — CARVEDILOL 6.25 MG/1
25 TABLET ORAL 2 TIMES DAILY WITH MEALS
Status: DISCONTINUED | OUTPATIENT
Start: 2022-02-21 | End: 2022-02-22 | Stop reason: HOSPADM

## 2022-02-21 RX ORDER — INSULIN ASPART 100 [IU]/ML
0-5 INJECTION, SOLUTION INTRAVENOUS; SUBCUTANEOUS
Status: DISCONTINUED | OUTPATIENT
Start: 2022-02-21 | End: 2022-02-22 | Stop reason: HOSPADM

## 2022-02-21 RX ORDER — PHENYLEPHRINE HYDROCHLORIDE 10 MG/ML
INJECTION INTRAVENOUS
Status: DISCONTINUED | OUTPATIENT
Start: 2022-02-21 | End: 2022-02-21

## 2022-02-21 RX ORDER — TAMSULOSIN HYDROCHLORIDE 0.4 MG/1
0.4 CAPSULE ORAL NIGHTLY
Status: DISCONTINUED | OUTPATIENT
Start: 2022-02-21 | End: 2022-02-22 | Stop reason: HOSPADM

## 2022-02-21 RX ORDER — LIDOCAINE HYDROCHLORIDE 20 MG/ML
INJECTION, SOLUTION EPIDURAL; INFILTRATION; INTRACAUDAL; PERINEURAL
Status: DISCONTINUED | OUTPATIENT
Start: 2022-02-21 | End: 2022-02-21

## 2022-02-21 RX ORDER — FOLIC ACID 1 MG/1
1 TABLET ORAL DAILY
Status: DISCONTINUED | OUTPATIENT
Start: 2022-02-22 | End: 2022-02-22 | Stop reason: HOSPADM

## 2022-02-21 RX ORDER — CYCLOBENZAPRINE HCL 5 MG
10 TABLET ORAL 3 TIMES DAILY PRN
Status: DISCONTINUED | OUTPATIENT
Start: 2022-02-21 | End: 2022-02-22 | Stop reason: HOSPADM

## 2022-02-21 RX ORDER — ONDANSETRON 2 MG/ML
4 INJECTION INTRAMUSCULAR; INTRAVENOUS DAILY PRN
Status: COMPLETED | OUTPATIENT
Start: 2022-02-21 | End: 2022-02-22

## 2022-02-21 RX ADMIN — SODIUM CHLORIDE, SODIUM LACTATE, POTASSIUM CHLORIDE, AND CALCIUM CHLORIDE: 600; 310; 30; 20 INJECTION, SOLUTION INTRAVENOUS at 12:02

## 2022-02-21 RX ADMIN — ROCURONIUM BROMIDE 5 MG: 10 INJECTION, SOLUTION INTRAVENOUS at 01:02

## 2022-02-21 RX ADMIN — CLINDAMYCIN PHOSPHATE 900 MG: 18 INJECTION, SOLUTION INTRAVENOUS at 01:02

## 2022-02-21 RX ADMIN — PHENYLEPHRINE HYDROCHLORIDE 100 MCG: 10 INJECTION INTRAVENOUS at 02:02

## 2022-02-21 RX ADMIN — PROPOFOL 50 MG: 10 INJECTION, EMULSION INTRAVENOUS at 01:02

## 2022-02-21 RX ADMIN — EPHEDRINE SULFATE 10 MG: 50 INJECTION INTRAVENOUS at 01:02

## 2022-02-21 RX ADMIN — MAGNESIUM SULFATE 2 G: 2 INJECTION INTRAVENOUS at 08:02

## 2022-02-21 RX ADMIN — LIDOCAINE HYDROCHLORIDE 80 MG: 20 INJECTION, SOLUTION EPIDURAL; INFILTRATION; INTRACAUDAL; PERINEURAL at 01:02

## 2022-02-21 RX ADMIN — DEXAMETHASONE SODIUM PHOSPHATE 8 MG: 4 INJECTION, SOLUTION INTRA-ARTICULAR; INTRALESIONAL; INTRAMUSCULAR; INTRAVENOUS; SOFT TISSUE at 01:02

## 2022-02-21 RX ADMIN — GLYCOPYRROLATE 0.2 MG: 0.2 INJECTION, SOLUTION INTRAMUSCULAR; INTRAVITREAL at 01:02

## 2022-02-21 RX ADMIN — FENTANYL CITRATE 50 MCG: 50 INJECTION, SOLUTION INTRAMUSCULAR; INTRAVENOUS at 01:02

## 2022-02-21 RX ADMIN — SUCCINYLCHOLINE CHLORIDE 140 MG: 20 INJECTION, SOLUTION INTRAMUSCULAR; INTRAVENOUS at 01:02

## 2022-02-21 RX ADMIN — PRAVASTATIN SODIUM 40 MG: 10 TABLET ORAL at 08:02

## 2022-02-21 RX ADMIN — TAMSULOSIN HYDROCHLORIDE 0.4 MG: 0.4 CAPSULE ORAL at 08:02

## 2022-02-21 RX ADMIN — ONDANSETRON 4 MG: 2 INJECTION, SOLUTION INTRAMUSCULAR; INTRAVENOUS at 03:02

## 2022-02-21 RX ADMIN — SODIUM CHLORIDE, SODIUM LACTATE, POTASSIUM CHLORIDE, AND CALCIUM CHLORIDE: 600; 310; 30; 20 INJECTION, SOLUTION INTRAVENOUS at 02:02

## 2022-02-21 RX ADMIN — ACETAMINOPHEN 1000 MG: 10 INJECTION, SOLUTION INTRAVENOUS at 02:02

## 2022-02-21 RX ADMIN — ETOMIDATE 18 MG: 2 INJECTION, SOLUTION INTRAVENOUS at 01:02

## 2022-02-21 RX ADMIN — TRAZODONE HYDROCHLORIDE 50 MG: 50 TABLET ORAL at 08:02

## 2022-02-21 RX ADMIN — CARVEDILOL 25 MG: 6.25 TABLET, FILM COATED ORAL at 06:02

## 2022-02-21 RX ADMIN — PROPOFOL 40 MG: 10 INJECTION, EMULSION INTRAVENOUS at 01:02

## 2022-02-21 RX ADMIN — FENTANYL CITRATE 50 MCG: 50 INJECTION, SOLUTION INTRAMUSCULAR; INTRAVENOUS at 02:02

## 2022-02-21 RX ADMIN — CALCITRIOL CAPSULES 0.25 MCG 0.5 MCG: 0.25 CAPSULE ORAL at 06:02

## 2022-02-21 RX ADMIN — EPHEDRINE SULFATE 15 MG: 50 INJECTION INTRAVENOUS at 01:02

## 2022-02-21 RX ADMIN — EPHEDRINE SULFATE 5 MG: 50 INJECTION INTRAVENOUS at 01:02

## 2022-02-21 RX ADMIN — EPHEDRINE SULFATE 10 MG: 50 INJECTION INTRAVENOUS at 02:02

## 2022-02-21 RX ADMIN — CALCIUM CARBONATE 2000 MG: 500 TABLET, CHEWABLE ORAL at 08:02

## 2022-02-21 NOTE — ANESTHESIA PROCEDURE NOTES
Intubation    Date/Time: 2/21/2022 1:24 PM  Performed by: Mira Dash CRNA  Authorized by: Mitra Garg MD     Intubation:     Induction:  Intravenous    Intubated:  Postinduction    Mask Ventilation:  Easy with oral airway    Attempts:  1    Attempted By:  CRNA    Method of Intubation:  Direct    Blade:  Arellano 2    Laryngeal View Grade: Grade I - full view of cords      Difficult Airway Encountered?: No      Complications:  None    Airway Device:  Oral endotracheal tube and EMG ETT (NIMS)    Airway Device Size:  7.0    Style/Cuff Inflation:  Cuffed (inflated to minimal occlusive pressure)    Inflation Amount (mL):  10    Tube secured:  22    Secured at:  The lips    Placement Verified By:  Capnometry    Complicating Factors:  None    Findings Post-Intubation:  BS equal bilateral and atraumatic/condition of teeth unchanged

## 2022-02-21 NOTE — TRANSFER OF CARE
"Anesthesia Transfer of Care Note    Patient: Humble Narvaez    Procedure(s) Performed: Procedure(s) (LRB):  THYROIDECTOMY (Bilateral)    Patient location: PACU    Anesthesia Type: general    Transport from OR: Transported from OR on room air with adequate spontaneous ventilation    Post pain: adequate analgesia    Post assessment: no apparent anesthetic complications    Post vital signs: stable    Level of consciousness: sedated    Nausea/Vomiting: no nausea/vomiting    Complications: none    Transfer of care protocol was followed      Last vitals:   Visit Vitals  BP (!) 168/89 (BP Location: Right arm, Patient Position: Sitting)   Pulse 61   Temp 36.4 °C (97.6 °F) (Temporal)   Resp 18   Ht 5' 10" (1.778 m)   Wt 90.9 kg (200 lb 6.4 oz)   SpO2 96%   BMI 28.75 kg/m²     "

## 2022-02-22 VITALS
HEIGHT: 70 IN | SYSTOLIC BLOOD PRESSURE: 164 MMHG | HEART RATE: 78 BPM | OXYGEN SATURATION: 95 % | BODY MASS INDEX: 28.69 KG/M2 | WEIGHT: 200.38 LBS | RESPIRATION RATE: 20 BRPM | DIASTOLIC BLOOD PRESSURE: 77 MMHG | TEMPERATURE: 98 F

## 2022-02-22 LAB
CALCIUM SERPL-MCNC: 8.8 MG/DL (ref 8.7–10.5)
CALCIUM SERPL-MCNC: 9 MG/DL (ref 8.7–10.5)
POCT GLUCOSE: 191 MG/DL (ref 70–110)

## 2022-02-22 PROCEDURE — 63600175 PHARM REV CODE 636 W HCPCS: Performed by: STUDENT IN AN ORGANIZED HEALTH CARE EDUCATION/TRAINING PROGRAM

## 2022-02-22 PROCEDURE — 82310 ASSAY OF CALCIUM: CPT | Performed by: OTOLARYNGOLOGY

## 2022-02-22 PROCEDURE — 25000003 PHARM REV CODE 250: Performed by: OTOLARYNGOLOGY

## 2022-02-22 RX ORDER — LANOLIN ALCOHOL/MO/W.PET/CERES
400 CREAM (GRAM) TOPICAL 2 TIMES DAILY
Status: DISCONTINUED | OUTPATIENT
Start: 2022-02-22 | End: 2022-02-22 | Stop reason: HOSPADM

## 2022-02-22 RX ORDER — CALCITRIOL 0.5 UG/1
0.5 CAPSULE ORAL DAILY
Qty: 30 CAPSULE | Refills: 0 | Status: SHIPPED | OUTPATIENT
Start: 2022-02-22 | End: 2022-03-21 | Stop reason: SDUPTHER

## 2022-02-22 RX ORDER — LANOLIN ALCOHOL/MO/W.PET/CERES
400 CREAM (GRAM) TOPICAL DAILY
Qty: 30 TABLET | Refills: 0 | Status: SHIPPED | OUTPATIENT
Start: 2022-02-22 | End: 2022-03-15 | Stop reason: SDUPTHER

## 2022-02-22 RX ORDER — OXYCODONE AND ACETAMINOPHEN 5; 325 MG/1; MG/1
1 TABLET ORAL EVERY 4 HOURS PRN
Qty: 30 TABLET | Refills: 0 | Status: SHIPPED | OUTPATIENT
Start: 2022-02-22

## 2022-02-22 RX ORDER — LEVOTHYROXINE SODIUM 150 UG/1
150 TABLET ORAL
Qty: 30 TABLET | Refills: 11 | Status: SHIPPED | OUTPATIENT
Start: 2022-02-22 | End: 2022-03-09 | Stop reason: SDUPTHER

## 2022-02-22 RX ADMIN — CARVEDILOL 25 MG: 6.25 TABLET, FILM COATED ORAL at 07:02

## 2022-02-22 RX ADMIN — CALCITRIOL CAPSULES 0.25 MCG 0.5 MCG: 0.25 CAPSULE ORAL at 08:02

## 2022-02-22 RX ADMIN — OXYCODONE HYDROCHLORIDE 10 MG: 5 TABLET ORAL at 07:02

## 2022-02-22 RX ADMIN — PANTOPRAZOLE SODIUM 40 MG: 40 TABLET, DELAYED RELEASE ORAL at 08:02

## 2022-02-22 RX ADMIN — CALCIUM CARBONATE 2000 MG: 500 TABLET, CHEWABLE ORAL at 08:02

## 2022-02-22 RX ADMIN — ONDANSETRON HYDROCHLORIDE 4 MG: 2 SOLUTION INTRAMUSCULAR; INTRAVENOUS at 12:02

## 2022-02-22 RX ADMIN — FOLIC ACID 1 MG: 1 TABLET ORAL at 08:02

## 2022-02-22 RX ADMIN — Medication 400 MG: at 08:02

## 2022-02-22 NOTE — PROGRESS NOTES
Pt discharged per MD order. Discharge instructions, appts and medications reviewed with pt and son. IV removed. Pt transported to family vehicle by wheelchair without incident or complaint.

## 2022-02-22 NOTE — ANESTHESIA POSTPROCEDURE EVALUATION
Anesthesia Post Evaluation    Patient: Humble Narvaez    Procedure(s) Performed: Procedure(s) (LRB):  THYROIDECTOMY (Bilateral)    Final Anesthesia Type: general      Patient location during evaluation: PACU  Patient participation: Yes- Able to Participate  Level of consciousness: awake and alert and oriented  Post-procedure vital signs: reviewed and stable  Pain management: adequate  Airway patency: patent    PONV status at discharge: No PONV  Anesthetic complications: no      Cardiovascular status: blood pressure returned to baseline and stable  Respiratory status: unassisted, spontaneous ventilation and room air  Hydration status: euvolemic  Follow-up not needed.          Vitals Value Taken Time   /67 02/22/22 0729   Temp 36.7 °C (98.1 °F) 02/22/22 0729   Pulse 78 02/22/22 0729   Resp 20 02/22/22 0729   SpO2 95 % 02/22/22 0729         Event Time   Out of Recovery 17:40:00         Pain/Maddie Score: Pain Rating Prior to Med Admin: 8 (2/22/2022  7:29 AM)  Maddie Score: 10 (2/21/2022  7:17 PM)

## 2022-02-22 NOTE — OP NOTE
Operative Note       Surgery Date: 2/21/2022     Surgeon: Lincoln Graves MD    Assistant:  Mikala Alonzo PA-C    Implants- None    Pre-op Diagnosis:  Papillary thyroid carcinoma    Post-op Diagnosis: same    Anesthesia: GETA    Technical Procedures Used:     Total thyroidectomy with limited neck dissection    Findings:  The entire left side of the thyroid gland was replaced by extremely hypervascular tumor extending across to the right-sided border of the isthmus.  There was significant substernal extension on the left side of the thyroid gland.  There were pathologically appearing lymph nodes or possibly exophytic thyroid nodules extending from the inferior border of the thyroid gland.  The left recurrent laryngeal nerve was identified and preserved.  This stimulated 0.5 milliamps at the termination of the case.  The right recurrent laryngeal nerve initially had good stimulation at 0.5 milliamps but the gland was extremely adherent at the entry point of the nerve at the correct grow thyroid membrane.  After dissecting this it no longer stimulated at that level but did have good stimulation at 1 milliamp.  There were multiple lymph nodes in the left paratracheal space that were of concern.  The left thyroid gland was extremely adherent or possibly invading the strap muscles.  The left inferior parathyroid gland could not be identified.  The superior parathyroid gland was identified preserved but extremely adherent to the gland and required significant manipulation to separate from the gland.  The right parathyroid glands were identified and preserved.  The right thyroid lobe demonstrated significant inflammatory changes and hypervascularity.      Estimated Blood Loss:30cc                     Specimens:   Specimens (From admission, onward)    Right thyroid lobe with isthmus  Left thyroid lobe  Paratracheal lymph nodes                 Indications:  Patient presented to the ENT clinic with a 6 cm left-sided thyroid mass.   Fine-needle aspiration demonstrated this to be consistent with papillary thyroid carcinoma.  I recommended total thyroidectomy with central neck dissection.  Risks benefits alternatives were discussed at length with the patient as well as his sons.  Written informed consent was obtained in the preoperative clinic visit.    Procedure Details   The patient was seen in the Holding Room. The risks, benefits, complications, treatment options, and expected outcomes were discussed with the patient. The possibilities of reaction to medication, pulmonary aspiration, perforation of viscus, bleeding, recurrent infection, finding a normal thyroid, recurrently laryngeal nerve damage, the need for additional procedures, failure to diagnose a condition, and creating a complication requiring transfusion or operation were discussed with the patient. The patient concurred with the proposed plan, giving informed consent.  The site of surgery properly noted/marked. The patient was taken to Operating Room, identified as Humble Narvaez and the procedure verified as Thyroidectomy. A Time Out was held and the above information confirmed.    The patient was placed supine after induction of a general anesthetic.  The neck was extended and prepped and draped in standard fashion.  A 6 cm transverse cervical incision was created above the sternal notch within a natural skin fold.  The strap muscles were identified and divided at the midline.  Sharp dissection was used to mobilize the left thyroid lobe in a medial direction.  Dissection continued posteriorly to expose the tracheoesophageal groove and carotid artery.  The recurrent laryngeal nerve was identified and preserved.  The thyroid lobe was mobilized further and the superior and inferior pole vessels were divided with the harmonic scalpel.  The middle thyroid vein was divided with the harmonic scalpel.  Small vessels were likewise divided.  The gland was rotated in a medial direction  and taken off the trachea using the bipolar.  The isthmus was removed off the thyroid cartilage. A suture was placed for orientation purposes.       Sharp dissection was used to mobilize the contralateral thyroid lobe in a medial direction.  Dissection continued posteriorly to expose the tracheoesophageal groove and carotid artery.  The recurrent laryngeal nerve was identified and preserved.  The thyroid lobe was mobilized further and the superior and inferior pole vessels were divided with the harmonic scalpel.  The middle thyroid vein was divided with the harmonic scalpel.  Small vessels were likewise divided.  The gland was rotated in a medial direction and taken off the trachea using the harmonic.     The wound was irrigated and inspected carefully.  The parathyroid tissue was found to be viable and the recurrent laryngeal nerve was left intact in its anatomic locations.  Surgicel snow was placed in the right and left paratracheal spaces.  A 15 Afghan Travon-Caceres drain was placed into the depths of the wound.  The strap muscles were closed with interrupted 3-0 Vicryl suture.  The platysma was closed with 4-0 Monocryl suture, and the skin incision was closed with a 5-0 Monocryl subcuticular closure.  Dermabond was used to seal the incision.    Instrument, sponge, and needle counts were correct prior to closure and at the conclusion of the case.              Complications:  None; patient tolerated the procedure well.           Disposition: PACU - hemodynamically stable.           Condition: stable.    Attending Attestation: I performed the procedure.

## 2022-02-22 NOTE — DISCHARGE SUMMARY
[facility]/Surg  Discharge Note  Short Stay    Procedure(s) (LRB):  THYROIDECTOMY (Bilateral)    OUTCOME: Patient tolerated treatment/procedure well without complication and is now ready for discharge.    DISPOSITION: Home or Self Care    FINAL DIAGNOSIS:  Papillary thyroid carcinoma    FOLLOWUP: In clinic    DISCHARGE INSTRUCTIONS:  No discharge procedures on file.     TIME SPENT ON DISCHARGE: 1 minutes

## 2022-02-22 NOTE — PATIENT INSTRUCTIONS
Thyroid Gland:  Your thyroid gland is in the front lower part of your neck. It makes hormones that help make your body work right. Your thyroid gland has 2 sections. Each section has parathyroid glands.    Thyroidectomy:  Thyroid nodules (round hard lump of cells) are common. If a nodule has cancer in it, then usually the entire thyroid gland is removed, but rarely just half of the gland is removed.  The reason for removing the whole gland rather than just half of the gland is that it lets doctors monitor your blood tests in the future to see if any thyroid tissue is present, suggesting a recurrence.  This is not possible if half of the gland is remaining.  Also, if you need radioactive iodine treatments, there cannot be any remaining thyroid tissue present so that the medicine goes to any microscopic disease in lymph nodes or other parts of your body that may not be detectable before cancer. Some people have a big thyroid that causes problems swallowing or breathing. This is called a goiter and is not cancer.If you have a goiter you may need surgery to take it out.     Dr. Graves makes an incision (cut) in the lower area of your neck. The exact size of the cut varies, but is usually about 1 1/2 to 2 inches.  He then carefully cuts out the thyroid lobe(s.)  He will find your vocal cord nerve and work around it. There are tiny glands called parathyroid glands that are carefully cut away from the gland and left in the neck.  There are 2 of these glands on each side, and they control the amount of calcium in your blood  If your whole thyroid needs to be taken out, the same process is carried out on the other side.     Your vocal cord is usually not harmed by the surgery. Your voice may be hoarse or weak after surgery. About 10% of patients have vocal cord immobility one one side, but this is permanent in only about 1-5% of cases We will check your vocal cords at your post-operative visit so try not to worry about it  until then.     For patients who have the entire thyroid gland removed (both sides) parathyroid glands may not work as well as they should after surgery. For patients just having one side removed, it is extremely rare for this to be a problem.  If your parathyroid glands are not working well, this can cause your calcium blood levels to drop too low. This can be life-threatening.  This may be a problem for a short time, or it may be long lasting. Up to 30% of patients who have total thyroidectomies will have temporary problems with their parathyroid glands.  Less than 5% of patients have permanent parathyroid problems after thyroid surgery unless there is a more extensive cancer surgery performed at the same time called a central neck dissection.    If you have just one half of your thyroid removed (thyroid lobectomy, or hemithyroidectomy) then you may go home the same day of surgery. You will need someone to drive you home.      If you have your entire thyroid gland removed, you may be admitted to the hospital overnight but sometimes patients go home the same day.  You will be discharged on calcium and vitamin D (calcitriol) supplements as well as magnesium.   The vitamin D supplement (calcitriol) is a prescription.  The magnesium and calcium are over the counter medications.    The easiest way to get the right  Calcium dose and swallow the pills easily is to buy over-the-counter TUMS ULTRA 1000mg chewable tablets.  Take 2 of these 3 times daily with meals.            The magnesium is available in the supplement section of your pharmacy.  Take 250mg of magnesium twice daily.    You will take the calcitriol for 1 month.  The calcium and magnesium are typically tapered by Dr. Graves or an endocrinologist over a few weeks after surgery.     If you have had your whole thyroid taken out, you will have to take a thyroid hormone pill every day for the rest of your life. Discuss this with Dr. Graves at your first postoperative  visit.  The exact dose of the med may need to be adjusted over time. We will ask your primary care doctor or endocrinologist (a doctor that treats diseases that affect your glands) to check blood tests 6 weeks after starting your thyroid hormone. Your dose of the thyroid hormone med will be adjusted as needed. Sometimes, your endocrinologist does not want you to start taking this hormone until the tests come back showing you do not have cancer. Your surgeon will let you know.    Post Operative Instructions    Head of Bed:  Please raise the head of your bed 30-45 degrees or sleep in a recliner for the first 3-4 days to decrease swelling. The skin above the incision may look swollen after lying down for a few hours.    Activity:  No straining, heavy lifting, or vigorous exercise for 2 weeks after surgery.  Most patients are able to return to work 1 week after surgery unless their job requires the above activities.    Diet:  You may eat your regular diet after surgery.    Pain:    Your pain can be mild to moderate the first 24 - 48 hours. The pain usually lessens after that. Many patients complain more about a sore throat from the breathing tube used during surgery then about pain from the surgery itself. Your pain will get better in 1-2 days and is best treated with throat lozenges.     You may not need strong narcotic pain medication. The sooner you reduce your narcotic pain medication use, the faster you will heal. As your pain lessens, try using extra-strength acetaminophen (Tylenol) instead of your narcotic med. It is best to reduce your pain to a level you can manage, rather than to get rid of the pain completely.  Please start at a lower of narcotic pain med, and increase the dose only if the pain remains uncontrolled. Decrease the dose if the side effects are too severe.   Do not drive, operate dangerous machinery, or do anything dangerous if you are taking narcotic pain medication (such as oxycodone,  hydrocodone, morphine, etc.) This medication affects your reflexes and responses, just like alcohol.     When to Call Your Surgeon:  If you have    Any concerns. We would much rather that you call your surgeon then worry at home, or get into trouble.    Any numbness or tingling around your mouth or in both of your fingers or toes. This may be a sign of low blood calcium levels.  If you experience this, take 2000mg of TUMS ULTRA (2 tabs) with a glass of milk.  If your symptoms last for 45 minutes then you should have someone drive you to the [facility] room.  . If you have muscle cramping and or curling of your fingers or toes, this could be even more seriously low blood calcium levels. THIS CAN BE A LIFE-THREATENING PROBLEM. If you experience this, take 2000mg of TUMS ULTRA (2 tabs) with a glass of milk then have someone drive you to the [facility] room. You must go have your blood calcium levels drawn immediately.  If you live too far away, go to a nearby ER. Have the ER staff call your Dr. Graves after drawing your blood calcium and giving you extra calcium if needed. Bring these postoperative instructions with you to show to them. If your blood calcium gets too low, you could have seizures or your heart could stop, so you must take this seriously!     Fever over 101.5 degrees F.     Foul smelling discharge from your incision.    Large amount of bleeding.    More than expected swelling of your neck.    Increase warmth or redness around the incision.     Problems urinating.    Pain that continues to increase instead of decrease.    Wound Care:  DERMABOND® Skin Closure System      It is extremely rare that stitches are placed that need to be removed.  Dr. Graves will let you know if you are an exception to this, but in the majority of cases, the stitches are placed beneath the skin and are absorbable.    Things to know  Bathing or showering  You may bathe or shower the same day as surgery unless directed  otherwise by Dr. Graves.   Do not soak or scrub your incision or wound.  After showering or bathing, gently blot your incision or wound dry with a soft towel.  If you experience any redness, swelling, discomfort, warmth or pus, contact Dr. Graves and he or she will determine how your incision or wound is healing and take the necessary steps to address any issues.  Exercise  Do not engage in strenuous exercise that may cause additional stress on your incision or wound for 2 weeks after surgery.  Light exercise such as going for a walk is recommended as it reduces the chances of clots in the legs or pneumonia.     Ointments or liquids  Topical ointments, liquids or any other product (other than dry  bandages) should not be applied to the incision while DERMABOND  PRINEO System is in place. These may loosen DERMABOND PRINEO  System from the skin before it has completely healed.

## 2022-02-24 ENCOUNTER — OFFICE VISIT (OUTPATIENT)
Dept: OTOLARYNGOLOGY | Facility: CLINIC | Age: 77
End: 2022-02-24
Payer: MEDICARE

## 2022-02-24 VITALS
WEIGHT: 200.19 LBS | TEMPERATURE: 98 F | SYSTOLIC BLOOD PRESSURE: 123 MMHG | BODY MASS INDEX: 28.72 KG/M2 | HEART RATE: 60 BPM | DIASTOLIC BLOOD PRESSURE: 75 MMHG

## 2022-02-24 DIAGNOSIS — E89.0 S/P THYROIDECTOMY: Primary | ICD-10-CM

## 2022-02-24 DIAGNOSIS — C73 PAPILLARY THYROID CARCINOMA: ICD-10-CM

## 2022-02-24 LAB — POCT GLUCOSE: 169 MG/DL (ref 70–110)

## 2022-02-24 PROCEDURE — 99214 OFFICE O/P EST MOD 30 MIN: CPT | Mod: PBBFAC | Performed by: PHYSICIAN ASSISTANT

## 2022-02-24 PROCEDURE — 99999 PR PBB SHADOW E&M-EST. PATIENT-LVL IV: CPT | Mod: PBBFAC,,, | Performed by: PHYSICIAN ASSISTANT

## 2022-02-24 PROCEDURE — 99024 POSTOP FOLLOW-UP VISIT: CPT | Mod: POP,,, | Performed by: PHYSICIAN ASSISTANT

## 2022-02-24 PROCEDURE — 99024 PR POST-OP FOLLOW-UP VISIT: ICD-10-PCS | Mod: POP,,, | Performed by: PHYSICIAN ASSISTANT

## 2022-02-24 PROCEDURE — 99999 PR PBB SHADOW E&M-EST. PATIENT-LVL IV: ICD-10-PCS | Mod: PBBFAC,,, | Performed by: PHYSICIAN ASSISTANT

## 2022-02-24 NOTE — PROGRESS NOTES
Subjective:   Patient: Humble Narvaez [id number], :1945   Visit date:2022 1:05 PM    Chief Complaint:  drain removal    HPI:  Humble is a 76 y.o. male who is here for follow-up after surgery:    Subjective: Reports mild pain with swallowing; no redness of the incision.  Last emptied drain reservoir this AM with less output (gradual decline since surgery).  No fever or hoarseness.    Surgery date: 2022    Operations performed: Total thyroidectomy with limited neck dissection with Dr. Graves    Pathology:  Pending; previous FNA Malignancy  Suspect   Papillary thyroid carcinoma.     Cultures:  n/a    Review of Systems:  -     Allergic/Immunologic: is allergic to lima bean, pcn [penicillins], and wasp sting [allergen ext-venom-honey bee]..  -     Constitutional: Current temp: 97.9 °F (36.6 °C) (Temporal)    His meds, allergies, medical, surgical, social & family histories were reviewed & updated:  -     He has a current medication list which includes the following prescription(s): acetaminophen, amiodarone, amitriptyline, amitriptyline, aspirin, calcitriol, calcium carbonate, carvedilol, cyanocobalamin, cyclobenzaprine, doxazosin, fish oil-omega-3 fatty acids, folic acid, glipizide, levothyroxine, magnesium oxide, multivitamin, naproxen, omeprazole, oxycodone-acetaminophen, pravastatin, pyridoxine (vitamin b6), tamsulosin, telmisartan, and trazodone.  -     He  has a past medical history of Anticoagulant long-term use, Arthritis, Atrial fibrillation, Coronary artery disease, Diabetes mellitus, Dilated cardiomyopathy, GERD (gastroesophageal reflux disease) (2/10/2022), Hypertension, Papillary thyroid carcinoma (2022), and Thyroid disease.   -     He does not have any pertinent problems on file.   -     He  has a past surgical history that includes Back surgery; Tonsillectomy; Eye surgery; Cardiac electrophysiology mapping and ablation; and Thyroidectomy (Bilateral, 2022).  -     He  reports that  pt presents with difficulty breathing. +wheezing B/L. +belly breathing. no fevers. NKDA. he quit smoking about 44 years ago. He quit smokeless tobacco use about 40 years ago. He reports that he does not drink alcohol and does not use drugs.  -     His family history includes Heart attack in his father.  -     He is allergic to lima bean, pcn [penicillins], and wasp sting [allergen ext-venom-honey bee].    Objective:     Physical Exam:  Vitals:  /75 (BP Location: Left arm, Patient Position: Sitting)   Pulse 60   Temp 97.9 °F (36.6 °C) (Temporal)   Wt 90.8 kg (200 lb 2.8 oz)   BMI 28.72 kg/m²   General appearance:  Well developed, well nourished, no apparent distress    Surgical site:  Incision is clean and dry, no erythema or fluctuance.  Drain removed today without difficulty (scant drainage in the reservoir, no active drainage or bleeding from drain site once removed).  Cover with gauze and bandage.    Assessment & Plan:   Humble was seen today for drain removal.    Diagnoses and all orders for this visit:    S/P thyroidectomy    Papillary thyroid carcinoma      Instructed them to call with any bleeding or purulent drainage from incision/drain site.  Keep wound clean and dry.  Discussed that he should continue to refrain from shaving near the incision.  He already has followup scheduled with Dr. Graves next month.  Instructed him to call with any new or worsening symptoms.

## 2022-03-01 LAB
FINAL PATHOLOGIC DIAGNOSIS: NORMAL
GROSS: NORMAL
Lab: NORMAL
MICROSCOPIC EXAM: NORMAL

## 2022-03-07 ENCOUNTER — TELEPHONE (OUTPATIENT)
Dept: OTOLARYNGOLOGY | Facility: CLINIC | Age: 77
End: 2022-03-07
Payer: MEDICARE

## 2022-03-07 NOTE — TELEPHONE ENCOUNTER
pT3a  Extensive angioinvasion  pN0  Intermediate- High Risk        Plan for endocrinology referral.  Possible VILLALOBOS.

## 2022-03-08 NOTE — PROGRESS NOTES
Subjective:   Patient: Humble Narvaez [id number], :1945   Visit date:3/9/2022 9:28 AM    Chief Complaint:  Post-op Evaluation    HPI:  Humble is a 76 y.o. male who is here for follow-up after surgery:    Subjective: Pt is 2 weeks s/p total thyroidectomy. On Synthroid 150 mcg. No pain.    Surgery date: 22    Operations performed: thyroidectomy, total    Pathology:          Cultures:  NA    Review of Systems:  -     Allergic/Immunologic: is allergic to lima bean, pcn [penicillins], and wasp sting [allergen ext-venom-honey bee]..  -     Constitutional: Current temp: 98.1 °F (36.7 °C) (Temporal)    His meds, allergies, medical, surgical, social & family histories were reviewed & updated:  -     He has a current medication list which includes the following prescription(s): amitriptyline, aspirin, calcitriol, calcium carbonate, carvedilol, cyanocobalamin, fish oil-omega-3 fatty acids, folic acid, glipizide, levothyroxine, magnesium oxide, multivitamin, naproxen, omeprazole, oxycodone-acetaminophen, pravastatin, pyridoxine (vitamin b6), tamsulosin, telmisartan, and trazodone.  -     He  has a past medical history of Anticoagulant long-term use, Arthritis, Atrial fibrillation, Coronary artery disease, Diabetes mellitus, Dilated cardiomyopathy, GERD (gastroesophageal reflux disease) (2/10/2022), Hypertension, Papillary thyroid carcinoma (2022), and Thyroid disease.   -     He does not have any pertinent problems on file.   -     He  has a past surgical history that includes Back surgery; Tonsillectomy; Eye surgery; Cardiac electrophysiology mapping and ablation; and Thyroidectomy (Bilateral, 2022).  -     He  reports that he quit smoking about 44 years ago. He quit smokeless tobacco use about 40 years ago. He reports that he does not drink alcohol and does not use drugs.  -     His family history includes Heart attack in his father.  -     He is allergic to lima bean, pcn [penicillins], and wasp sting  "[allergen ext-venom-honey bee].    Objective:     Physical Exam:  Vitals:  /82   Pulse (!) 59   Temp 98.1 °F (36.7 °C) (Temporal)   Ht 5' 10" (1.778 m)   Wt 92 kg (202 lb 13.2 oz)   BMI 29.10 kg/m²   General appearance:  Well developed, well nourished, no apparent distress; voice clear    Surgical site: inc C/D/I; no surrounding erythema or edema      Assessment & Plan:   Humble was seen today for post-op evaluation.    Diagnoses and all orders for this visit:    Papillary thyroid carcinoma  -     Ambulatory referral/consult to Endocrinology; Future    Referral placed to endocrinologist, Dr. Betancur, in [place], [place].   Check pth, ca, mg today  TSH in 3 weeks      "

## 2022-03-09 ENCOUNTER — OFFICE VISIT (OUTPATIENT)
Dept: OTOLARYNGOLOGY | Facility: CLINIC | Age: 77
End: 2022-03-09
Payer: MEDICARE

## 2022-03-09 ENCOUNTER — LAB VISIT (OUTPATIENT)
Dept: LAB | Facility: HOSPITAL | Age: 77
End: 2022-03-09
Attending: OTOLARYNGOLOGY
Payer: MEDICARE

## 2022-03-09 VITALS
TEMPERATURE: 98 F | WEIGHT: 202.81 LBS | HEIGHT: 70 IN | SYSTOLIC BLOOD PRESSURE: 134 MMHG | DIASTOLIC BLOOD PRESSURE: 82 MMHG | HEART RATE: 59 BPM | BODY MASS INDEX: 29.03 KG/M2

## 2022-03-09 DIAGNOSIS — C73 PAPILLARY THYROID CARCINOMA: ICD-10-CM

## 2022-03-09 DIAGNOSIS — C73 PAPILLARY THYROID CARCINOMA: Primary | ICD-10-CM

## 2022-03-09 LAB
CALCIUM SERPL-MCNC: 9.3 MG/DL (ref 8.7–10.5)
MAGNESIUM SERPL-MCNC: 1.5 MG/DL (ref 1.6–2.6)
PTH-INTACT SERPL-MCNC: 11.1 PG/ML (ref 9–77)

## 2022-03-09 PROCEDURE — 83970 ASSAY OF PARATHORMONE: CPT | Performed by: OTOLARYNGOLOGY

## 2022-03-09 PROCEDURE — 82310 ASSAY OF CALCIUM: CPT | Performed by: OTOLARYNGOLOGY

## 2022-03-09 PROCEDURE — 99024 POSTOP FOLLOW-UP VISIT: CPT | Mod: POP,,, | Performed by: OTOLARYNGOLOGY

## 2022-03-09 PROCEDURE — 99999 PR PBB SHADOW E&M-EST. PATIENT-LVL V: ICD-10-PCS | Mod: PBBFAC,,, | Performed by: OTOLARYNGOLOGY

## 2022-03-09 PROCEDURE — 99024 PR POST-OP FOLLOW-UP VISIT: ICD-10-PCS | Mod: POP,,, | Performed by: OTOLARYNGOLOGY

## 2022-03-09 PROCEDURE — 36415 COLL VENOUS BLD VENIPUNCTURE: CPT | Performed by: OTOLARYNGOLOGY

## 2022-03-09 PROCEDURE — 99999 PR PBB SHADOW E&M-EST. PATIENT-LVL V: CPT | Mod: PBBFAC,,, | Performed by: OTOLARYNGOLOGY

## 2022-03-09 PROCEDURE — 83735 ASSAY OF MAGNESIUM: CPT | Performed by: OTOLARYNGOLOGY

## 2022-03-09 PROCEDURE — 99215 OFFICE O/P EST HI 40 MIN: CPT | Mod: PBBFAC | Performed by: OTOLARYNGOLOGY

## 2022-03-09 RX ORDER — LEVOTHYROXINE SODIUM 150 UG/1
150 TABLET ORAL
Qty: 30 TABLET | Refills: 11 | Status: SHIPPED | OUTPATIENT
Start: 2022-03-09 | End: 2023-03-09

## 2022-03-09 RX ORDER — PRAVASTATIN SODIUM 80 MG/1
80 TABLET ORAL DAILY
COMMUNITY
Start: 2022-02-05

## 2022-03-09 RX ORDER — GLIPIZIDE 5 MG/1
5 TABLET ORAL 2 TIMES DAILY
COMMUNITY
Start: 2022-03-06

## 2022-03-09 NOTE — PATIENT INSTRUCTIONS
Low Iodine Diet  To increase the effectiveness of your upcoming radioactive iodine therapy, you may be prescribed a low iodine diet. Iodine is used in the care and feeding of animals and as a stabilizer and/or safety element in food processing. Therefore, it may be found in varying amounts in all food and beverages. The highest sources (and those to be avoided) are iodized salt, grains and cereals, some breads, fish from the sea, shellfish, beef, poultry, pudding mixes, milk and milk products. Detailed recipes that follow a low iodine diet can be found on the following websites: www.Globial ([employer]) and www.thyca.org (ThyCa: Thyroid Cancer Survivors Association). Below are general guidelines to follow on this diet.    Menu Options  Breakfast  Any fruit or fruit juices  Egg Beaters  Oatmeal with toppings - cinnamon, honey, applesauce, maple syrup, walnuts, fruit  1 slice toast  Black coffee or tea    Lunch  Vegetarian or chicken with rice soup  Matzo crackers  White or brown rice with vegetable plate (fresh or frozen)  Salad - fruit or vegetable - oil and vinegar dressing  Fruits - fresh, frozen or canned  Black coffee or tea    Dinner  6 oz Roast beef, lamb, veal, pork, or turkey  Potato - baked or broiled  Vegetables (fresh or frozen)  Salad - fruit or vegetable - oil and vinegar dressing  Fruits  Black coffee or tea    Snacks  Fresh fruit or juice  Dried fruits such as raisins  Fresh raw vegetables  Applesauce  Unsalted nuts  Fruit juice  Unsalted peanut butter (great with apple slices, carrot sticks, crackers or rice cakes)  Matzoh and other unsalted crackers  Home-made bread and muffins    Summary:   No iodized salt   No dairy products or foods containing dairy products   No foods from the sea   Limited grain products (ie noodles, pasta, pastries) - 1 slice bread, ½ cup pasta daily   Limited amounts of beef, chicken and turkey    AVOID THE FOLLOWING FOODS    Iodized salt  Any  vitamins or supplements that contain iodine (especially kelp and dulse)  Milk or other dairy products including ice cream, cheese, yogurt and butter  Seafood including fish, sushi, shellfish, kelp or seaweed  Herbal supplements  Foods that contain the additive carrageen, agar-agar, alginate, or kulwinder  Commercially prepared bakery products that are made with iodate dough conditioners  FD&C red dye #3 - this appears in maraschino cherries and occasionally as a pink/red artificial color in beverages  Egg yolks, whole eggs and foods containing whole eggs  Milk chocolate (due to dairy content)  Blackstrap Molasses (unsulfured molasses is fine)  Soy products (soy sauce, soy milk, tofu) [note: soy does not contain iodine. However, high soy ingestion has been shown to interfere with radioactive iodine uptake in animal studies.]  FOODS THAT ARE OK    Non-iodized salt or non-iodized sea salt may be used as desired  Egg whites  Homemade bread made with non-iodized salt and oil (not soy!) instead of butter or milk or commercially-baked breads which do not contain iodate dough conditioners, dairy, or eggs  Fresh fruits and vegetables  Frozen vegetables  Grain, cereal products and pasta without high iodine ingredients  Canned fruit  Natural unsalted nuts and nut butters (peanut, almond, etc)  Sodas, beer, wine, lemonade, fruit juices  Coffee or tea. But remember, no milk or cream and no soy-based non-dairy creamer!  Popcorn popped in vegetable oil or air popped, with non-iodized salt  Black pepper, fresh or dried herbs and spices, all vegetable oils  Sugar, jam, jelly, honey maple syrup  Matzoh crackers  ADDITIONAL GUIDELINES    Avoid restaurant foods since there is no reasonable way to determine which restaurants use iodized salt.  Consult your doctor before discontinuing any red-colored medication or any medication containing iodine (i.e., Amiodarone, expectorants, topical antiseptics).  Avoid all herbal supplements (especially  when one is not sure how much iodine they contain).

## 2022-03-10 ENCOUNTER — TELEPHONE (OUTPATIENT)
Dept: OTOLARYNGOLOGY | Facility: CLINIC | Age: 77
End: 2022-03-10
Payer: MEDICARE

## 2022-03-10 NOTE — TELEPHONE ENCOUNTER
Returned call to patient's son Roberto. Placed referral to the requested Endocrinologist in [place] Dr. Moreno Power and faxed referral pathology results and provider notes to fax [phone] as requested. Patient has a scheduled appointment on 04/05/22.

## 2022-03-10 NOTE — TELEPHONE ENCOUNTER
----- Message from Drea Chung sent at 3/10/2022 10:58 AM CST -----  Contact: Roberto/ Son  Roberto would like a call back at [phone], in regards faxing information to endocrinologist.

## 2022-03-14 ENCOUNTER — TELEPHONE (OUTPATIENT)
Dept: OTOLARYNGOLOGY | Facility: CLINIC | Age: 77
End: 2022-03-14
Payer: MEDICARE

## 2022-03-14 DIAGNOSIS — C73 PAPILLARY THYROID CARCINOMA: Primary | ICD-10-CM

## 2022-03-14 NOTE — PROGRESS NOTES
PTH is improving and calcium looks OK.  Magnesium is still very low. He needs to increase his magnesium.  Also, he can decrease calcium to 1 TUMS three times daily.  Keep taking calcitriol.  Needs labs drawn in 1 week.  I willl put in orders for the labs.

## 2022-03-14 NOTE — TELEPHONE ENCOUNTER
----- Message from Lincoln Graves MD sent at 3/14/2022  1:11 PM CDT -----  PTH is improving and calcium looks OK.  Magnesium is still very low. He needs to increase his magnesium.  Also, he can decrease calcium to 1 TUMS three times daily.  Keep taking calcitriol.  Needs labs drawn in 1 week.  I willl put in orders for the labs.

## 2022-03-14 NOTE — TELEPHONE ENCOUNTER
Spoke with son.  He verbalized understanding of med changes.  Will call office tomorrow if he needs any refills.

## 2022-03-14 NOTE — TELEPHONE ENCOUNTER
Spoke with pt and provided results and recommendations.  Lab appt scheduled for next week.  Would like this info to be given to his son, Roberto.  Called Roberto, no answer, left voicemail requesting a return call.    Increase Mag Ox to 2 tabs daily.  Decrease tums to 1 tab three times daily.  Continue calcitriol as ordered.  Labs in 1 week.

## 2022-03-15 RX ORDER — LANOLIN ALCOHOL/MO/W.PET/CERES
400 CREAM (GRAM) TOPICAL 2 TIMES DAILY
Qty: 60 TABLET | Refills: 1 | Status: SHIPPED | OUTPATIENT
Start: 2022-03-15

## 2022-03-21 ENCOUNTER — TELEPHONE (OUTPATIENT)
Dept: OTOLARYNGOLOGY | Facility: CLINIC | Age: 77
End: 2022-03-21
Payer: MEDICARE

## 2022-03-21 DIAGNOSIS — E89.0 S/P THYROIDECTOMY: Primary | ICD-10-CM

## 2022-03-21 RX ORDER — CALCITRIOL 0.5 UG/1
0.5 CAPSULE ORAL DAILY
Qty: 30 CAPSULE | Refills: 0 | Status: SHIPPED | OUTPATIENT
Start: 2022-03-21 | End: 2022-04-20

## 2022-03-21 NOTE — TELEPHONE ENCOUNTER
----- Message from Susie Cantu sent at 3/21/2022  8:42 AM CDT -----  Contact: harley Edwards is requesting a call in regards to pt's medication. Please call him back at [phone].            Thanks  DD

## 2022-03-21 NOTE — TELEPHONE ENCOUNTER
Spoke to Roberto. Informed pt is to stay on the Calcitriol until told to stop. Informed will send a refill request to Dr. Graves. Voiced understanding.

## 2022-03-23 ENCOUNTER — LAB VISIT (OUTPATIENT)
Dept: LAB | Facility: HOSPITAL | Age: 77
End: 2022-03-23
Attending: OTOLARYNGOLOGY
Payer: MEDICARE

## 2022-03-23 DIAGNOSIS — C73 PAPILLARY THYROID CARCINOMA: ICD-10-CM

## 2022-03-23 LAB
CALCIUM SERPL-MCNC: 9.6 MG/DL (ref 8.7–10.5)
PTH-INTACT SERPL-MCNC: 14.7 PG/ML (ref 9–77)

## 2022-03-23 PROCEDURE — 83970 ASSAY OF PARATHORMONE: CPT | Performed by: OTOLARYNGOLOGY

## 2022-03-23 PROCEDURE — 36415 COLL VENOUS BLD VENIPUNCTURE: CPT | Performed by: OTOLARYNGOLOGY

## 2022-03-23 PROCEDURE — 82310 ASSAY OF CALCIUM: CPT | Performed by: OTOLARYNGOLOGY

## 2022-03-28 ENCOUNTER — TELEPHONE (OUTPATIENT)
Dept: OTOLARYNGOLOGY | Facility: CLINIC | Age: 77
End: 2022-03-28
Payer: MEDICARE

## 2022-03-28 DIAGNOSIS — C73 PAPILLARY THYROID CARCINOMA: Primary | ICD-10-CM

## 2022-04-05 ENCOUNTER — LAB VISIT (OUTPATIENT)
Dept: LAB | Facility: HOSPITAL | Age: 77
End: 2022-04-05
Attending: OTOLARYNGOLOGY
Payer: MEDICARE

## 2022-04-05 DIAGNOSIS — C73 PAPILLARY THYROID CARCINOMA: ICD-10-CM

## 2022-04-05 LAB
CALCIUM SERPL-MCNC: 9.1 MG/DL (ref 8.7–10.5)
MAGNESIUM SERPL-MCNC: 1.5 MG/DL (ref 1.6–2.6)
PTH-INTACT SERPL-MCNC: 16.1 PG/ML (ref 9–77)

## 2022-04-05 PROCEDURE — 83970 ASSAY OF PARATHORMONE: CPT | Performed by: OTOLARYNGOLOGY

## 2022-04-05 PROCEDURE — 83735 ASSAY OF MAGNESIUM: CPT | Performed by: OTOLARYNGOLOGY

## 2022-04-05 PROCEDURE — 36415 COLL VENOUS BLD VENIPUNCTURE: CPT | Mod: PO | Performed by: OTOLARYNGOLOGY

## 2022-04-05 PROCEDURE — 82310 ASSAY OF CALCIUM: CPT | Performed by: OTOLARYNGOLOGY

## 2022-04-07 ENCOUNTER — TELEPHONE (OUTPATIENT)
Dept: OTOLARYNGOLOGY | Facility: CLINIC | Age: 77
End: 2022-04-07
Payer: MEDICARE

## 2022-04-07 DIAGNOSIS — E83.42 HYPOMAGNESEMIA: Primary | ICD-10-CM

## 2022-04-07 PROBLEM — E07.9 THYROID MASS: Status: RESOLVED | Noted: 2022-02-10 | Resolved: 2022-04-07

## 2022-04-07 NOTE — TELEPHONE ENCOUNTER
Patients abiodun Mejia has been notified of the labs and changes to discontinue the calcium. Labs have been faxed to Dr. Moreno [facility].

## 2022-04-07 NOTE — TELEPHONE ENCOUNTER
Labs reviewed.  Taking Calcium 500mg BID.  Normal calcium and PTH.  Magnesium is still low.  Will stop calcium.  Needs to continue magnesium.  Will follow up with his PCP or endocrinologist regarding magnesium.

## 2022-05-07 NOTE — TELEPHONE ENCOUNTER
----- Message from Lincoln Graves MD sent at 3/28/2022  1:01 PM CDT -----  Current meds: Mag Ox 400mg BID tabs daily.  Decrease tums to 1 tab three times daily.  Continue calcitriol as ordered.      Please let him know that the labs continue to improve.  He has chronic low magnesium and should stay on his current dose for now.  This is something that he should discuss with his PCP or endocrinologist after we get him off of the calcium.  He can decrease to 1/2 TUMS (500mg)  2  times daily and we will recheck his labs in a week.   
Spoke with patient to convey results and recommendations regarding labs and repeat labs next week per Dr. Graves. Scheduled repeat labs next week at [facility]. Also left details on his son voicemail per request from patient.   
DISPLAY PLAN FREE TEXT

## (undated) DEVICE — SPONGE GAUZE 16PLY 4X4

## (undated) DEVICE — ELECTRODE BLADE W/SLEEVE 2.75

## (undated) DEVICE — SUT MONOCRYL 4-0 PS-1 UND

## (undated) DEVICE — GLOVE SURG BIOGEL LATEX SZ 7.5

## (undated) DEVICE — DRAIN SURG HUBLESS 30CM 15FR

## (undated) DEVICE — SUT 3-0 VICRYL / SH (J416)

## (undated) DEVICE — EVACUATOR WOUND BULB 100CC

## (undated) DEVICE — APPLIER LIGACLIP SM 9.38IN

## (undated) DEVICE — TOWEL OR DISP STRL BLUE 4/PK

## (undated) DEVICE — SUT STRATAFIX SPIRAL MONO

## (undated) DEVICE — SHEARS HARMONIC CRVD 9 CM

## (undated) DEVICE — SEE MEDLINE ITEM 157027

## (undated) DEVICE — GLOVE SURGEONS ULTRA TOUCH 5.5

## (undated) DEVICE — HEMOSTAT SURGICEL SNOW ABSORB

## (undated) DEVICE — Device

## (undated) DEVICE — SYR 10CC LUER LOCK

## (undated) DEVICE — RETRACTOR RADIALUX LIGHTED

## (undated) DEVICE — COVER LIGHT HANDLE 80/CA

## (undated) DEVICE — PROBE SIMULATOR KRAFF

## (undated) DEVICE — GAUZE SPONGE PEANUT STRL

## (undated) DEVICE — APPLICATOR CHLORAPREP ORN 26ML

## (undated) DEVICE — SUT 2/0 30IN SILK BLK BRAI

## (undated) DEVICE — CORD BIPOLAR ELECTROSURGICAL

## (undated) DEVICE — SHEET THYROID W/ISO-BAC

## (undated) DEVICE — COVER CAMERA OPERATING ROOM

## (undated) DEVICE — DRESSING TRANS 2X2 TEGADERM

## (undated) DEVICE — SEE MEDLINE ITEM 157117

## (undated) DEVICE — DRAIN BLAKE HUBLS 10F RD

## (undated) DEVICE — NDL HYPO 27G X 1 1/2

## (undated) DEVICE — SOL NS 1000CC

## (undated) DEVICE — SEE MEDLINE ITEM 157194

## (undated) DEVICE — ELECTRODE REM PLYHSV RETURN 9